# Patient Record
Sex: FEMALE | Race: BLACK OR AFRICAN AMERICAN | Employment: UNEMPLOYED | URBAN - METROPOLITAN AREA
[De-identification: names, ages, dates, MRNs, and addresses within clinical notes are randomized per-mention and may not be internally consistent; named-entity substitution may affect disease eponyms.]

---

## 2018-07-11 ENCOUNTER — APPOINTMENT (OUTPATIENT)
Dept: GENERAL RADIOLOGY | Age: 58
End: 2018-07-11
Attending: NURSE PRACTITIONER
Payer: MEDICARE

## 2018-07-11 ENCOUNTER — HOSPITAL ENCOUNTER (EMERGENCY)
Age: 58
Discharge: HOME | End: 2018-07-11
Attending: EMERGENCY MEDICINE
Payer: MEDICARE

## 2018-07-11 VITALS
HEART RATE: 67 BPM | DIASTOLIC BLOOD PRESSURE: 57 MMHG | OXYGEN SATURATION: 97 % | RESPIRATION RATE: 15 BRPM | SYSTOLIC BLOOD PRESSURE: 175 MMHG | TEMPERATURE: 98.2 F

## 2018-07-11 DIAGNOSIS — R07.89 CHEST WALL PAIN: ICD-10-CM

## 2018-07-11 DIAGNOSIS — M54.2 NECK PAIN ON LEFT SIDE: ICD-10-CM

## 2018-07-11 DIAGNOSIS — M54.12 CERVICAL RADICULAR PAIN: Primary | ICD-10-CM

## 2018-07-11 DIAGNOSIS — I10 ESSENTIAL HYPERTENSION: ICD-10-CM

## 2018-07-11 DIAGNOSIS — M79.602 LEFT ARM PAIN: ICD-10-CM

## 2018-07-11 DIAGNOSIS — M25.512 LEFT SHOULDER PAIN, UNSPECIFIED CHRONICITY: ICD-10-CM

## 2018-07-11 LAB
ANION GAP SERPL CALC-SCNC: 7 MMOL/L (ref 3–18)
BASOPHILS # BLD: 0 K/UL (ref 0–0.1)
BASOPHILS NFR BLD: 0 % (ref 0–2)
BNP SERPL-MCNC: 323 PG/ML (ref 0–900)
BUN SERPL-MCNC: 24 MG/DL (ref 7–18)
BUN/CREAT SERPL: 14 (ref 12–20)
CALCIUM SERPL-MCNC: 8.9 MG/DL (ref 8.5–10.1)
CHLORIDE SERPL-SCNC: 109 MMOL/L (ref 100–108)
CK MB CFR SERPL CALC: 1.7 % (ref 0–4)
CK MB SERPL-MCNC: 3.1 NG/ML (ref 5–25)
CK SERPL-CCNC: 178 U/L (ref 26–192)
CO2 SERPL-SCNC: 27 MMOL/L (ref 21–32)
CREAT SERPL-MCNC: 1.66 MG/DL (ref 0.6–1.3)
DIFFERENTIAL METHOD BLD: ABNORMAL
EOSINOPHIL # BLD: 0.3 K/UL (ref 0–0.4)
EOSINOPHIL NFR BLD: 4 % (ref 0–5)
ERYTHROCYTE [DISTWIDTH] IN BLOOD BY AUTOMATED COUNT: 15.8 % (ref 11.6–14.5)
GLUCOSE SERPL-MCNC: 174 MG/DL (ref 74–99)
HCT VFR BLD AUTO: 36.1 % (ref 35–45)
HGB BLD-MCNC: 11.5 G/DL (ref 12–16)
LYMPHOCYTES # BLD: 2.1 K/UL (ref 0.9–3.6)
LYMPHOCYTES NFR BLD: 28 % (ref 21–52)
MCH RBC QN AUTO: 24.6 PG (ref 24–34)
MCHC RBC AUTO-ENTMCNC: 31.9 G/DL (ref 31–37)
MCV RBC AUTO: 77.3 FL (ref 74–97)
MONOCYTES # BLD: 0.4 K/UL (ref 0.05–1.2)
MONOCYTES NFR BLD: 5 % (ref 3–10)
NEUTS SEG # BLD: 4.6 K/UL (ref 1.8–8)
NEUTS SEG NFR BLD: 63 % (ref 40–73)
PLATELET # BLD AUTO: 213 K/UL (ref 135–420)
PMV BLD AUTO: 11.1 FL (ref 9.2–11.8)
POTASSIUM SERPL-SCNC: 4.1 MMOL/L (ref 3.5–5.5)
RBC # BLD AUTO: 4.67 M/UL (ref 4.2–5.3)
SODIUM SERPL-SCNC: 143 MMOL/L (ref 136–145)
TROPONIN I SERPL-MCNC: <0.02 NG/ML (ref 0–0.04)
TROPONIN I SERPL-MCNC: <0.02 NG/ML (ref 0–0.04)
WBC # BLD AUTO: 7.4 K/UL (ref 4.6–13.2)

## 2018-07-11 PROCEDURE — 74011250637 HC RX REV CODE- 250/637: Performed by: EMERGENCY MEDICINE

## 2018-07-11 PROCEDURE — 80048 BASIC METABOLIC PNL TOTAL CA: CPT | Performed by: NURSE PRACTITIONER

## 2018-07-11 PROCEDURE — 99285 EMERGENCY DEPT VISIT HI MDM: CPT

## 2018-07-11 PROCEDURE — 77030029684 HC NEB SM VOL KT MONA -A

## 2018-07-11 PROCEDURE — 96375 TX/PRO/DX INJ NEW DRUG ADDON: CPT

## 2018-07-11 PROCEDURE — 83880 ASSAY OF NATRIURETIC PEPTIDE: CPT | Performed by: EMERGENCY MEDICINE

## 2018-07-11 PROCEDURE — 71045 X-RAY EXAM CHEST 1 VIEW: CPT

## 2018-07-11 PROCEDURE — 94640 AIRWAY INHALATION TREATMENT: CPT

## 2018-07-11 PROCEDURE — 93005 ELECTROCARDIOGRAM TRACING: CPT

## 2018-07-11 PROCEDURE — 82550 ASSAY OF CK (CPK): CPT | Performed by: NURSE PRACTITIONER

## 2018-07-11 PROCEDURE — 96361 HYDRATE IV INFUSION ADD-ON: CPT

## 2018-07-11 PROCEDURE — 74011250636 HC RX REV CODE- 250/636: Performed by: EMERGENCY MEDICINE

## 2018-07-11 PROCEDURE — 96374 THER/PROPH/DIAG INJ IV PUSH: CPT

## 2018-07-11 PROCEDURE — 85025 COMPLETE CBC W/AUTO DIFF WBC: CPT | Performed by: NURSE PRACTITIONER

## 2018-07-11 PROCEDURE — 74011000250 HC RX REV CODE- 250: Performed by: EMERGENCY MEDICINE

## 2018-07-11 RX ORDER — ACETAMINOPHEN 500 MG
1000 TABLET ORAL
Qty: 50 TAB | Refills: 0 | Status: SHIPPED | OUTPATIENT
Start: 2018-07-11

## 2018-07-11 RX ORDER — GUAIFENESIN 100 MG/5ML
324 LIQUID (ML) ORAL
Status: COMPLETED | OUTPATIENT
Start: 2018-07-11 | End: 2018-07-11

## 2018-07-11 RX ORDER — NAPROXEN 500 MG/1
500 TABLET ORAL 2 TIMES DAILY WITH MEALS
Qty: 20 TAB | Refills: 0 | Status: SHIPPED | OUTPATIENT
Start: 2018-07-11 | End: 2018-07-21

## 2018-07-11 RX ORDER — NAPROXEN 250 MG/1
500 TABLET ORAL 2 TIMES DAILY WITH MEALS
Status: DISCONTINUED | OUTPATIENT
Start: 2018-07-11 | End: 2018-07-11

## 2018-07-11 RX ORDER — TRAMADOL HYDROCHLORIDE 50 MG/1
50 TABLET ORAL
Qty: 8 TAB | Refills: 0 | Status: SHIPPED | OUTPATIENT
Start: 2018-07-11

## 2018-07-11 RX ORDER — HYDRALAZINE HYDROCHLORIDE 20 MG/ML
20 INJECTION INTRAMUSCULAR; INTRAVENOUS ONCE
Status: DISCONTINUED | OUTPATIENT
Start: 2018-07-11 | End: 2018-07-11

## 2018-07-11 RX ORDER — NAPROXEN 250 MG/1
500 TABLET ORAL
Status: COMPLETED | OUTPATIENT
Start: 2018-07-11 | End: 2018-07-11

## 2018-07-11 RX ORDER — IPRATROPIUM BROMIDE AND ALBUTEROL SULFATE 2.5; .5 MG/3ML; MG/3ML
3 SOLUTION RESPIRATORY (INHALATION) ONCE
Status: COMPLETED | OUTPATIENT
Start: 2018-07-11 | End: 2018-07-11

## 2018-07-11 RX ORDER — ACETAMINOPHEN 500 MG
1000 TABLET ORAL
Status: COMPLETED | OUTPATIENT
Start: 2018-07-11 | End: 2018-07-11

## 2018-07-11 RX ORDER — TRAMADOL HYDROCHLORIDE 50 MG/1
50 TABLET ORAL ONCE
Status: COMPLETED | OUTPATIENT
Start: 2018-07-11 | End: 2018-07-11

## 2018-07-11 RX ORDER — HYDRALAZINE HYDROCHLORIDE 20 MG/ML
20 INJECTION INTRAMUSCULAR; INTRAVENOUS ONCE
Status: COMPLETED | OUTPATIENT
Start: 2018-07-11 | End: 2018-07-11

## 2018-07-11 RX ORDER — LABETALOL HCL 20 MG/4 ML
20 SYRINGE (ML) INTRAVENOUS ONCE
Status: COMPLETED | OUTPATIENT
Start: 2018-07-11 | End: 2018-07-11

## 2018-07-11 RX ADMIN — ACETAMINOPHEN 1000 MG: 500 TABLET, FILM COATED ORAL at 14:48

## 2018-07-11 RX ADMIN — HYDRALAZINE HYDROCHLORIDE 20 MG: 20 INJECTION INTRAMUSCULAR; INTRAVENOUS at 18:02

## 2018-07-11 RX ADMIN — ACETAMINOPHEN 1000 MG: 500 TABLET, FILM COATED ORAL at 18:02

## 2018-07-11 RX ADMIN — TRAMADOL HYDROCHLORIDE 50 MG: 50 TABLET, FILM COATED ORAL at 14:48

## 2018-07-11 RX ADMIN — IPRATROPIUM BROMIDE AND ALBUTEROL SULFATE 3 ML: .5; 3 SOLUTION RESPIRATORY (INHALATION) at 13:59

## 2018-07-11 RX ADMIN — NAPROXEN 500 MG: 250 TABLET ORAL at 18:02

## 2018-07-11 RX ADMIN — ASPIRIN 81 MG 324 MG: 81 TABLET ORAL at 14:48

## 2018-07-11 RX ADMIN — SODIUM CHLORIDE 500 ML: 900 INJECTION, SOLUTION INTRAVENOUS at 13:59

## 2018-07-11 RX ADMIN — LABETALOL 20 MG/4 ML (5 MG/ML) INTRAVENOUS SYRINGE 20 MG: at 13:40

## 2018-07-11 NOTE — ED PROVIDER NOTES
EMERGENCY DEPARTMENT HISTORY AND PHYSICAL EXAM    1:25 PM      Date: 7/11/2018  Patient Name: Teagan Shannon    History of Presenting Illness     Chief Complaint   Patient presents with    Arm Pain    Hypertension         History Provided By: Patient    Chief Complaint: Shoulder Pain  Duration:3  Weeks  Timing:  Intermittent  Location: Left arm radiating down arm and to neck  Quality: Sharp  Severity: Moderate  Modifying Factors: No relief with aleve  Associated Symptoms: left chest pain that radiates to the back      Additional History (Context): 2:09 PM Teagan Shannon is a 60 y.o. female who presents to ED complaining of moderate sharp intermittent left shoulder pain radiating down the arm and to the neck associated with left sided chest pain that radiates to the back onset 3 weeks. Patient states that the sx have been intermittent but daily. States that she also had a stress test 2 years ago at [facility] by Dr. Kary Henriquez. She denies any modification to the pain with lifting and also denies any injury to the arm. She does not smoke or use ETOH. No other concerns or symptoms at this time. PCP: Wanda Rand MD          Past History     Past Medical History:   HTN    Family History:  No family history on file. Social History:  Denies Illicit drug and tobacco use    Allergies: Allergies   Allergen Reactions    Penicillins Other (comments)     \"I fall out\"       Review of Systems       Review of Systems   Constitutional: Negative for chills and fever. HENT: Negative for ear pain and sore throat. Eyes: Negative for pain and visual disturbance. Respiratory: Negative for cough and shortness of breath. Cardiovascular: Positive for chest pain (left). Negative for palpitations. Gastrointestinal: Negative for abdominal pain, diarrhea, nausea and vomiting. Genitourinary: Negative for flank pain. Musculoskeletal: Positive for back pain (left) and neck pain (left).         Left shoulder pain  Left arm pain   Neurological: Negative for syncope and headaches. Psychiatric/Behavioral: Negative for agitation. The patient is not nervous/anxious. Physical Exam     Visit Vitals    BP (!) 215/67    Pulse 78    Temp 98.2 °F (36.8 °C)    Resp 18    SpO2 97%         Physical Exam   Constitutional: She is oriented to person, place, and time. She appears well-developed and well-nourished. HENT:   Head: Normocephalic and atraumatic. Mouth/Throat: Oropharynx is clear and moist.   Eyes: Pupils are equal, round, and reactive to light. No scleral icterus. Neck: Neck supple. No tracheal deviation present. Cardiovascular: Regular rhythm. No murmur heard. Pulmonary/Chest: Effort normal. No respiratory distress. She has wheezes (faint expiratory on the right). She exhibits tenderness. Abdominal: Soft. There is no tenderness. Musculoskeletal: Normal range of motion. She exhibits tenderness. She exhibits no deformity. TTP to the musculature of left back around the scapula, trapezius, and left cervical paraspinous. Reproducible chest wall pain to left chest   Neurological: She is alert and oriented to person, place, and time. No gross neuro deficit   Skin: Skin is warm and dry. No rash noted. She is not diaphoretic. Psychiatric: She has a normal mood and affect. Nursing note and vitals reviewed.         Diagnostic Study Results     Labs -  Labs Reviewed   CBC WITH AUTOMATED DIFF - Abnormal; Notable for the following:        Result Value    HGB 11.5 (*)     RDW 15.8 (*)     All other components within normal limits   METABOLIC PANEL, BASIC - Abnormal; Notable for the following:     Chloride 109 (*)     Glucose 174 (*)     BUN 24 (*)     Creatinine 1.66 (*)     GFR est AA 38 (*)     GFR est non-AA 32 (*)     All other components within normal limits   CARDIAC PANEL,(CK, CKMB & TROPONIN)   NT-PRO BNP   TROPONIN I       Radiologic Studies -   XR CHEST PORT   Final Result IMPRESSION  IMPRESSION:        1. Mild cardiomegaly without CHF. No acute pulmonary disease. Medical Decision Making   I am the first provider for this patient. I reviewed the vital signs, available nursing notes, past medical history, past surgical history, family history and social history. Vital Signs-Reviewed the patient's vital signs. Pulse Oximetry Analysis -  97% on room air , Normal    Cardiac Monitor:  Rate: 78 bom  Rhythm:  Normal Sinus Rhythm     EKG: Interpreted by the EP. Time 15:39  NSR at a rate of 68 bpm. Normal axis. Intervals within normal limits. Nonspecific T wave abnormalities in the inferior and lateral leads. No acute ST elevations      Records Reviewed: Nursing Notes and Old Medical Records (Time of Review: 1:25 PM)      MDM, Progress Notes, Reevaluation, and Consults:    DDX: cervical radiculopathy, osteorthritis. Diabetic neuropathy, PAD, unlikely MI, hypertensive emergency, ACS    ED Course     5:14 PM   Repeat BP were checked in both arms which were both 230/70 and 228/70 with 2+ equal radial pulse and mediastinal widening on CXR. Patient denies any CP currently, no HA, but continues to have some pain in her left shoulder and arm. Patient reports having Physical therapy for similar sx in past that presented with her documented cervical radiculopathy in 2017. Record review shows BP documented in January of 182/90. Do not feel sxs are of cardiac etiology, troponin negative x 2 with no significant ecg changes. Bp improved with medications, has long hx of essential HTN.      The patient was given:  Medications   labetalol (NORMODYNE;TRANDATE) 20 mg/4 mL (5 mg/mL) injection 20 mg (20 mg IntraVENous Given 7/11/18 1340)   albuterol-ipratropium (DUO-NEB) 2.5 MG-0.5 MG/3 ML (3 mL Nebulization Given 7/11/18 1359)   sodium chloride 0.9 % bolus infusion 500 mL (500 mL IntraVENous New Bag 7/11/18 1359)   acetaminophen (TYLENOL) tablet 1,000 mg (1,000 mg Oral Given 7/11/18 1448) aspirin chewable tablet 324 mg (324 mg Oral Given 7/11/18 1448)   traMADol (ULTRAM) tablet 50 mg (50 mg Oral Given 7/11/18 1448)     Patient has no new complaints, changes, or physical findings. Diagnostic studies were reviewed with the patient. Pt and/or family's questions and concerns were addressed. Care plan was outlined, including follow-up with PCP/specialist and return precautions were discussed. Patient is felt to be stable for discharge at this time. Diagnosis     Clinical Impression:   1. Left arm pain    2. Left shoulder pain, unspecified chronicity    3. Neck pain on left side    4. Cervical radicular pain        Disposition: DC    Follow-up Information     None           Patient's Medications    No medications on file     _______________________________  180 Ronnie Lugo acting as a scribe for and in the presence of Anderson Marr DO      July 11, 2018 at [facility] PM       Provider Attestation:      I personally performed the services described in the documentation, reviewed the documentation, as recorded by the scribe in my presence, and it accurately and completely records my words and actions.  July 11, 2018 at 1:25 PM - Anderson Marr DO

## 2018-07-11 NOTE — ED TRIAGE NOTES
Pt arrived through triage with c/o left arm and shoulder pain x 3 weeks. Pt BP noted to be 246/104 in triage.

## 2018-07-12 LAB
ATRIAL RATE: 68 BPM
ATRIAL RATE: 81 BPM
CALCULATED P AXIS, ECG09: 28 DEGREES
CALCULATED P AXIS, ECG09: 37 DEGREES
CALCULATED R AXIS, ECG10: -4 DEGREES
CALCULATED R AXIS, ECG10: -5 DEGREES
CALCULATED T AXIS, ECG11: -3 DEGREES
CALCULATED T AXIS, ECG11: 43 DEGREES
DIAGNOSIS, 93000: NORMAL
DIAGNOSIS, 93000: NORMAL
P-R INTERVAL, ECG05: 164 MS
P-R INTERVAL, ECG05: 180 MS
Q-T INTERVAL, ECG07: 404 MS
Q-T INTERVAL, ECG07: 412 MS
QRS DURATION, ECG06: 78 MS
QRS DURATION, ECG06: 84 MS
QTC CALCULATION (BEZET), ECG08: 438 MS
QTC CALCULATION (BEZET), ECG08: 469 MS
VENTRICULAR RATE, ECG03: 68 BPM
VENTRICULAR RATE, ECG03: 81 BPM

## 2020-10-04 ENCOUNTER — APPOINTMENT (OUTPATIENT)
Dept: CT IMAGING | Age: 60
End: 2020-10-04
Attending: STUDENT IN AN ORGANIZED HEALTH CARE EDUCATION/TRAINING PROGRAM
Payer: MEDICARE

## 2020-10-04 ENCOUNTER — APPOINTMENT (OUTPATIENT)
Dept: GENERAL RADIOLOGY | Age: 60
End: 2020-10-04
Attending: STUDENT IN AN ORGANIZED HEALTH CARE EDUCATION/TRAINING PROGRAM
Payer: MEDICARE

## 2020-10-04 ENCOUNTER — HOSPITAL ENCOUNTER (OUTPATIENT)
Age: 60
Setting detail: OBSERVATION
Discharge: HOME | End: 2020-10-07
Attending: STUDENT IN AN ORGANIZED HEALTH CARE EDUCATION/TRAINING PROGRAM | Admitting: INTERNAL MEDICINE
Payer: MEDICARE

## 2020-10-04 ENCOUNTER — APPOINTMENT (OUTPATIENT)
Dept: ULTRASOUND IMAGING | Age: 60
End: 2020-10-04
Attending: STUDENT IN AN ORGANIZED HEALTH CARE EDUCATION/TRAINING PROGRAM
Payer: MEDICARE

## 2020-10-04 DIAGNOSIS — N18.9 ACUTE ON CHRONIC RENAL INSUFFICIENCY: Primary | ICD-10-CM

## 2020-10-04 DIAGNOSIS — N28.9 ACUTE ON CHRONIC RENAL INSUFFICIENCY: Primary | ICD-10-CM

## 2020-10-04 DIAGNOSIS — R10.11 ABDOMINAL PAIN, RIGHT UPPER QUADRANT: ICD-10-CM

## 2020-10-04 PROBLEM — E87.5 HYPERKALEMIA: Status: ACTIVE | Noted: 2020-10-04

## 2020-10-04 PROBLEM — N17.9 AKI (ACUTE KIDNEY INJURY) (HCC): Status: ACTIVE | Noted: 2020-10-04

## 2020-10-04 PROBLEM — I10 HTN (HYPERTENSION): Status: ACTIVE | Noted: 2020-10-04

## 2020-10-04 PROBLEM — E66.9 OBESITY: Status: ACTIVE | Noted: 2020-10-04

## 2020-10-04 PROBLEM — M54.50: Status: ACTIVE | Noted: 2020-10-04

## 2020-10-04 LAB
ALBUMIN SERPL-MCNC: 2.9 G/DL (ref 3.4–5)
ALBUMIN/GLOB SERPL: 0.7 {RATIO} (ref 0.8–1.7)
ALP SERPL-CCNC: 238 U/L (ref 45–117)
ALT SERPL-CCNC: 21 U/L (ref 13–56)
ANION GAP SERPL CALC-SCNC: 8 MMOL/L (ref 3–18)
APPEARANCE UR: CLEAR
AST SERPL-CCNC: 16 U/L (ref 10–38)
BACTERIA URNS QL MICRO: ABNORMAL /HPF
BASOPHILS # BLD: 0 K/UL (ref 0–0.1)
BASOPHILS NFR BLD: 0 % (ref 0–2)
BILIRUB DIRECT SERPL-MCNC: <0.1 MG/DL (ref 0–0.2)
BILIRUB SERPL-MCNC: 0.2 MG/DL (ref 0.2–1)
BILIRUB UR QL: NEGATIVE
BUN SERPL-MCNC: 45 MG/DL (ref 7–18)
BUN/CREAT SERPL: 16 (ref 12–20)
CALCIUM SERPL-MCNC: 8.7 MG/DL (ref 8.5–10.1)
CHLORIDE SERPL-SCNC: 115 MMOL/L (ref 100–111)
CO2 SERPL-SCNC: 18 MMOL/L (ref 21–32)
COLOR UR: YELLOW
CREAT SERPL-MCNC: 2.86 MG/DL (ref 0.6–1.3)
DIFFERENTIAL METHOD BLD: ABNORMAL
EOSINOPHIL # BLD: 0.2 K/UL (ref 0–0.4)
EOSINOPHIL NFR BLD: 3 % (ref 0–5)
EPITH CASTS URNS QL MICRO: ABNORMAL /LPF (ref 0–5)
ERYTHROCYTE [DISTWIDTH] IN BLOOD BY AUTOMATED COUNT: 16 % (ref 11.6–14.5)
GLOBULIN SER CALC-MCNC: 4.4 G/DL (ref 2–4)
GLUCOSE BLD STRIP.AUTO-MCNC: 111 MG/DL (ref 70–110)
GLUCOSE SERPL-MCNC: 103 MG/DL (ref 74–99)
GLUCOSE UR STRIP.AUTO-MCNC: NEGATIVE MG/DL
HCT VFR BLD AUTO: 32.8 % (ref 35–45)
HGB BLD-MCNC: 9.9 G/DL (ref 12–16)
HGB UR QL STRIP: NEGATIVE
KETONES UR QL STRIP.AUTO: NEGATIVE MG/DL
LACTATE BLD-SCNC: 0.79 MMOL/L (ref 0.4–2)
LEUKOCYTE ESTERASE UR QL STRIP.AUTO: NEGATIVE
LIPASE SERPL-CCNC: 448 U/L (ref 73–393)
LYMPHOCYTES # BLD: 1.4 K/UL (ref 0.9–3.6)
LYMPHOCYTES NFR BLD: 18 % (ref 21–52)
MCH RBC QN AUTO: 23.6 PG (ref 24–34)
MCHC RBC AUTO-ENTMCNC: 30.2 G/DL (ref 31–37)
MCV RBC AUTO: 78.3 FL (ref 74–97)
MONOCYTES # BLD: 0.5 K/UL (ref 0.05–1.2)
MONOCYTES NFR BLD: 6 % (ref 3–10)
NEUTS SEG # BLD: 5.5 K/UL (ref 1.8–8)
NEUTS SEG NFR BLD: 73 % (ref 40–73)
NITRITE UR QL STRIP.AUTO: NEGATIVE
PH UR STRIP: 6.5 [PH] (ref 5–8)
PLATELET # BLD AUTO: 224 K/UL (ref 135–420)
PMV BLD AUTO: 11.5 FL (ref 9.2–11.8)
POTASSIUM SERPL-SCNC: 5.6 MMOL/L (ref 3.5–5.5)
PROT SERPL-MCNC: 7.3 G/DL (ref 6.4–8.2)
PROT UR STRIP-MCNC: 300 MG/DL
RBC # BLD AUTO: 4.19 M/UL (ref 4.2–5.3)
RBC #/AREA URNS HPF: ABNORMAL /HPF (ref 0–5)
SODIUM SERPL-SCNC: 141 MMOL/L (ref 136–145)
SP GR UR REFRACTOMETRY: 1.01 (ref 1–1.03)
UROBILINOGEN UR QL STRIP.AUTO: 0.2 EU/DL (ref 0.2–1)
WBC # BLD AUTO: 7.7 K/UL (ref 4.6–13.2)
WBC URNS QL MICRO: ABNORMAL /HPF (ref 0–4)

## 2020-10-04 PROCEDURE — 81001 URINALYSIS AUTO W/SCOPE: CPT

## 2020-10-04 PROCEDURE — 80048 BASIC METABOLIC PNL TOTAL CA: CPT

## 2020-10-04 PROCEDURE — 83690 ASSAY OF LIPASE: CPT

## 2020-10-04 PROCEDURE — 83605 ASSAY OF LACTIC ACID: CPT

## 2020-10-04 PROCEDURE — 65390000012 HC CONDITION CODE 44 OBSERVATION

## 2020-10-04 PROCEDURE — 85025 COMPLETE CBC W/AUTO DIFF WBC: CPT

## 2020-10-04 PROCEDURE — 82962 GLUCOSE BLOOD TEST: CPT

## 2020-10-04 PROCEDURE — 2709999900 HC NON-CHARGEABLE SUPPLY

## 2020-10-04 PROCEDURE — 74011250636 HC RX REV CODE- 250/636: Performed by: INTERNAL MEDICINE

## 2020-10-04 PROCEDURE — 71046 X-RAY EXAM CHEST 2 VIEWS: CPT

## 2020-10-04 PROCEDURE — 99284 EMERGENCY DEPT VISIT MOD MDM: CPT

## 2020-10-04 PROCEDURE — 74011250637 HC RX REV CODE- 250/637: Performed by: STUDENT IN AN ORGANIZED HEALTH CARE EDUCATION/TRAINING PROGRAM

## 2020-10-04 PROCEDURE — 74176 CT ABD & PELVIS W/O CONTRAST: CPT

## 2020-10-04 PROCEDURE — 99218 HC RM OBSERVATION: CPT

## 2020-10-04 PROCEDURE — 74011250637 HC RX REV CODE- 250/637: Performed by: INTERNAL MEDICINE

## 2020-10-04 PROCEDURE — 96372 THER/PROPH/DIAG INJ SC/IM: CPT

## 2020-10-04 PROCEDURE — 76705 ECHO EXAM OF ABDOMEN: CPT

## 2020-10-04 PROCEDURE — 80076 HEPATIC FUNCTION PANEL: CPT

## 2020-10-04 RX ORDER — CARVEDILOL 12.5 MG/1
25 TABLET ORAL
Status: COMPLETED | OUTPATIENT
Start: 2020-10-04 | End: 2020-10-04

## 2020-10-04 RX ORDER — PHENYTOIN 50 MG/1
100 TABLET, CHEWABLE ORAL 3 TIMES DAILY
COMMUNITY

## 2020-10-04 RX ORDER — LOSARTAN POTASSIUM 50 MG/1
100 TABLET ORAL DAILY
Status: DISCONTINUED | OUTPATIENT
Start: 2020-10-04 | End: 2020-10-04

## 2020-10-04 RX ORDER — OMEPRAZOLE 20 MG/1
40 CAPSULE, DELAYED RELEASE ORAL DAILY
COMMUNITY

## 2020-10-04 RX ORDER — CHLORTHALIDONE 25 MG/1
25 TABLET ORAL DAILY
COMMUNITY
End: 2020-10-07

## 2020-10-04 RX ORDER — TRAMADOL HYDROCHLORIDE 50 MG/1
50 TABLET ORAL
Status: DISCONTINUED | OUTPATIENT
Start: 2020-10-04 | End: 2020-10-07 | Stop reason: HOSPADM

## 2020-10-04 RX ORDER — HYDRALAZINE HYDROCHLORIDE 50 MG/1
25 TABLET, FILM COATED ORAL 3 TIMES DAILY
Status: ON HOLD | COMMUNITY
End: 2020-10-07 | Stop reason: SDUPTHER

## 2020-10-04 RX ORDER — DEXTROSE MONOHYDRATE 25 G/50ML
25-50 INJECTION, SOLUTION INTRAVENOUS AS NEEDED
Status: DISCONTINUED | OUTPATIENT
Start: 2020-10-04 | End: 2020-10-07 | Stop reason: HOSPADM

## 2020-10-04 RX ORDER — GUAIFENESIN 100 MG/5ML
81 LIQUID (ML) ORAL DAILY
COMMUNITY

## 2020-10-04 RX ORDER — SODIUM CHLORIDE, SODIUM LACTATE, POTASSIUM CHLORIDE, CALCIUM CHLORIDE 600; 310; 30; 20 MG/100ML; MG/100ML; MG/100ML; MG/100ML
150 INJECTION, SOLUTION INTRAVENOUS CONTINUOUS
Status: DISCONTINUED | OUTPATIENT
Start: 2020-10-04 | End: 2020-10-07 | Stop reason: HOSPADM

## 2020-10-04 RX ORDER — SODIUM POLYSTYRENE SULFONATE 15 G/60ML
15 SUSPENSION ORAL; RECTAL
Status: COMPLETED | OUTPATIENT
Start: 2020-10-04 | End: 2020-10-04

## 2020-10-04 RX ORDER — HEPARIN SODIUM 5000 [USP'U]/ML
5000 INJECTION, SOLUTION INTRAVENOUS; SUBCUTANEOUS EVERY 8 HOURS
Status: DISCONTINUED | OUTPATIENT
Start: 2020-10-04 | End: 2020-10-07 | Stop reason: HOSPADM

## 2020-10-04 RX ORDER — HYDRALAZINE HYDROCHLORIDE 20 MG/ML
10 INJECTION INTRAMUSCULAR; INTRAVENOUS
Status: DISCONTINUED | OUTPATIENT
Start: 2020-10-04 | End: 2020-10-07 | Stop reason: HOSPADM

## 2020-10-04 RX ORDER — MAGNESIUM SULFATE 100 %
4 CRYSTALS MISCELLANEOUS AS NEEDED
Status: DISCONTINUED | OUTPATIENT
Start: 2020-10-04 | End: 2020-10-07 | Stop reason: HOSPADM

## 2020-10-04 RX ORDER — SODIUM CHLORIDE 0.9 % (FLUSH) 0.9 %
5-40 SYRINGE (ML) INJECTION AS NEEDED
Status: DISCONTINUED | OUTPATIENT
Start: 2020-10-04 | End: 2020-10-07 | Stop reason: HOSPADM

## 2020-10-04 RX ORDER — SODIUM CHLORIDE 0.9 % (FLUSH) 0.9 %
5-40 SYRINGE (ML) INJECTION EVERY 8 HOURS
Status: DISCONTINUED | OUTPATIENT
Start: 2020-10-04 | End: 2020-10-07 | Stop reason: HOSPADM

## 2020-10-04 RX ORDER — ACETAMINOPHEN 500 MG
1000 TABLET ORAL
Status: DISCONTINUED | OUTPATIENT
Start: 2020-10-04 | End: 2020-10-07 | Stop reason: HOSPADM

## 2020-10-04 RX ORDER — LOSARTAN POTASSIUM 50 MG/1
100 TABLET ORAL DAILY
Status: DISCONTINUED | OUTPATIENT
Start: 2020-10-05 | End: 2020-10-04

## 2020-10-04 RX ORDER — ATORVASTATIN CALCIUM 40 MG/1
40 TABLET, FILM COATED ORAL DAILY
COMMUNITY

## 2020-10-04 RX ORDER — HYDROCODONE BITARTRATE AND ACETAMINOPHEN 5; 325 MG/1; MG/1
1 TABLET ORAL
Status: DISCONTINUED | OUTPATIENT
Start: 2020-10-04 | End: 2020-10-04

## 2020-10-04 RX ORDER — PHENOL/SODIUM PHENOLATE
20 AEROSOL, SPRAY (ML) MUCOUS MEMBRANE DAILY
COMMUNITY

## 2020-10-04 RX ORDER — INSULIN LISPRO 100 [IU]/ML
INJECTION, SOLUTION INTRAVENOUS; SUBCUTANEOUS
Status: DISCONTINUED | OUTPATIENT
Start: 2020-10-05 | End: 2020-10-07 | Stop reason: HOSPADM

## 2020-10-04 RX ORDER — METFORMIN HYDROCHLORIDE 500 MG/1
500 TABLET ORAL DAILY
COMMUNITY
End: 2020-10-07

## 2020-10-04 RX ORDER — NALOXONE HYDROCHLORIDE 0.4 MG/ML
0.4 INJECTION, SOLUTION INTRAMUSCULAR; INTRAVENOUS; SUBCUTANEOUS AS NEEDED
Status: DISCONTINUED | OUTPATIENT
Start: 2020-10-04 | End: 2020-10-07 | Stop reason: HOSPADM

## 2020-10-04 RX ADMIN — HEPARIN SODIUM 5000 UNITS: 5000 INJECTION INTRAVENOUS; SUBCUTANEOUS at 21:20

## 2020-10-04 RX ADMIN — ACETAMINOPHEN 1000 MG: 500 TABLET, FILM COATED ORAL at 21:20

## 2020-10-04 RX ADMIN — LOSARTAN POTASSIUM 100 MG: 50 TABLET, FILM COATED ORAL at 14:12

## 2020-10-04 RX ADMIN — SODIUM POLYSTYRENE SULFONATE 15 G: 15 SUSPENSION ORAL; RECTAL at 13:25

## 2020-10-04 RX ADMIN — TRAMADOL HYDROCHLORIDE 50 MG: 50 TABLET, FILM COATED ORAL at 23:33

## 2020-10-04 RX ADMIN — Medication 10 ML: at 21:35

## 2020-10-04 RX ADMIN — NIFEDIPINE 90 MG: 60 TABLET, FILM COATED, EXTENDED RELEASE ORAL at 14:12

## 2020-10-04 RX ADMIN — HYDROCODONE BITARTRATE AND ACETAMINOPHEN 1 TABLET: 5; 325 TABLET ORAL at 16:20

## 2020-10-04 RX ADMIN — HYDROCODONE BITARTRATE AND ACETAMINOPHEN 1 TABLET: 5; 325 TABLET ORAL at 12:12

## 2020-10-04 RX ADMIN — CARVEDILOL 25 MG: 12.5 TABLET, FILM COATED ORAL at 14:12

## 2020-10-04 RX ADMIN — SODIUM CHLORIDE, SODIUM LACTATE, POTASSIUM CHLORIDE, AND CALCIUM CHLORIDE 150 ML/HR: 600; 310; 30; 20 INJECTION, SOLUTION INTRAVENOUS at 19:58

## 2020-10-04 NOTE — ED PROVIDER NOTES
HPI   Talisha Hurt is a 63 y.o. female with history of CKD who complains of right-sided flank pain, right chest wall pain for 4 days. She fell approximately 11 days ago on her right side, believes she had just a sore shoulder however 4 days ago she had worsening right upper quadrant pain, is tender to palpation along the right upper quadrant, right lateral chest wall. She at first thought it was because of the fall. She states she also has a history of asthma. She is not wheezing at this time. She last saw her PCP 2 months ago. History reviewed. No pertinent past medical history. History reviewed. No pertinent surgical history. History reviewed. No pertinent family history.     Social History     Socioeconomic History    Marital status: SINGLE     Spouse name: Not on file    Number of children: Not on file    Years of education: Not on file    Highest education level: Not on file   Occupational History    Not on file   Social Needs    Financial resource strain: Not on file    Food insecurity     Worry: Not on file     Inability: Not on file    Transportation needs     Medical: Not on file     Non-medical: Not on file   Tobacco Use    Smoking status: Not on file   Substance and Sexual Activity    Alcohol use: Not on file    Drug use: Not on file    Sexual activity: Not on file   Lifestyle    Physical activity     Days per week: Not on file     Minutes per session: Not on file    Stress: Not on file   Relationships    Social connections     Talks on phone: Not on file     Gets together: Not on file     Attends [employer] service: Not on file     Active member of club or organization: Not on file     Attends meetings of clubs or organizations: Not on file     Relationship status: Not on file    Intimate partner violence     Fear of current or ex partner: Not on file     Emotionally abused: Not on file     Physically abused: Not on file     Forced sexual activity: Not on file   Other Topics Concern    Not on file   Social History Narrative    Not on file         ALLERGIES: Penicillins    Review of Systems     In addition to that documented in the HPI above  Constitutional: Denies fevers or chills  Eyes: Denies vision changes  ENMT: Denies sore throat  CV: Denies chest pain  Resp: Denies SOB  GI: Denies vomiting or diarrhea  : Denies painful urination  MSK: Denies recent trauma  Skin: Denies new rashes  Neuro: Denies new numbness or tingling or weakness  Endocrine: Denies polyuria  Heme: Denies bleeding disorders      Vitals:    10/04/20 1400 10/04/20 1445 10/04/20 1500 10/04/20 1545   BP: (!) 206/82 (!) 177/70 (!) 186/56 (!) 177/64   Pulse:       Resp:       Temp:       SpO2: 100% 99% 98% 100%            Physical Exam   Vital signs wdl  General: Patient is well nourished, well developed, awake and alert, resting comfortably in no acute distress  Head: Normocephalic and atraumatic  Eyes: Extraocular muscles intact, no conjunctival pallor  Ear, nose, throat: Normal external exam  Neck: Normal range of motion  Cardiovascular: RRR, murmur auscultated, warm, well-perfused extremities  Respiratory: Patient is in no respiratory distress, lungs CTAB  GI: soft, non-distended  TTP ruq, right lateral lower ribs  MSK: No gross deformities appreciated  Extremities: pulses intact with good cap refills, no LE pitting edema or calf tenderness  Skin: Warm, dry, and intact  Neuro: The patient is alert and oriented, no gross motor or sensory defects noted. Psych: Appropriate mood and affect. MDM   Considered Pancreatitis, Cholecystitis, Cholangitis, Hepatitis, Appendicitis, Diverticulitis, Colitis, Gastritis, PUD, Ureterolithiasis, SBO, Gastroparesis (DM?), Bowel Perforation (?recent scope or hx of PUD?)      Concerning for elevated alk phos, lipase.      ED Course as of Oct 04 1640   Sun Oct 04, 2020   1201 URINE MICROSCOPIC ONLY(!):    WBC 0 to 3   RBC 0 to 3   Epithelial cells 2+   Bacteria 1+(!) [DM]   1313 Given kayexalate    [DM]      ED Course User Index  [DM] Ravi Denis MD   2:01 PM  Giving home dose Procardia 90, losartan 100, carvedilol 25. She has not taken any medications for hypertension today. Procedures      From [facility] records    albuterol (PROVENTIL, VENTOLIN) 90 mcg/Actuation INH HFAA   Take 2 Puffs inhaled by mouth Every 4 Hours As Needed.   0       aspirin 81 mg PO CHEW   Take 1 Tab by Mouth Once a Day. 30 Tab   5 03/27/2015     atorvastatin (LIPITOR) 40 mg PO TABS   Take 1 Tab by Mouth Every Night at Bedtime. 30 Tab   5 03/27/2015     carvedilol 25 mg PO TABS   Take 1 Tab by Mouth 2 Times Daily with Meals. 60 Tab   1 11/17/2018     chlorthalidone (HYGROTON) 25 mg PO TABS   Take 50 mg by Mouth Once a Day.   0       HYDROcodone-acetaminophen (NORCO 5) 5-325 mg PO TABS   Take 1 Tab by Mouth Every 6 Hours As Needed. 40 Tab   0 11/26/2018     Losartan 100 mg PO TABS   Take 100 mg by Mouth Once a Day.   0       metFORMIN ER (GLUCOPHAGE ER) 500 mg PO TB24 24 hour tablet   Take 1,000 mg by Mouth Daily in A. M.   0       NIFEdipine 24HR (PROCARDIA XL) 90 mg PO TR24   Take 90 mg by Mouth Once a Day.   0       OMEPRAZOLE PO   Take 20 mg by Mouth Once a Day.   0       sitaGLIPtin (JANUVIA) 100 mg PO TABS   Take 100 mg by Mouth Once a Day.   0       Spironolactone 50 mg PO TABS   Take 50 mg by Mouth Once a Day.   0       tamsulosin (FLOMAX) 0.4 mg PO CP24   Take 1 Cap by Mouth Daily After supper. 30 Cap   0 11/17/2018     VITAMIN D2 50,000 unit PO CAPS   take 1 capsule by mouth every 7 days 4 Cap   11 10/02/2017     phenyTOIN ER (DILANTIN) 100 mg PO CAPS                   SIGN OUT:  2:20 PM  Patient's presentation, labs/imaging and plan of care was reviewed with Dr. Odilia Zamora as part of sign out. They will follow up on CT/US as part of the plan discussed with the patient.     Dr. Daphne Blevins assistance in completion of this plan is greatly appreciated but it should be noted that I will be the provider of record for this patient.

## 2020-10-04 NOTE — PROGRESS NOTES
1805  Received pt from Er via stretcher, alert and oriented, pain under control at this time,no skin breakdown noted. No SOB but she said she has asthma, SOB noted upon coming from the bathroom. No nausea, slight pain  On her ride side of the abdomen below right rib cage, lungs clear at this time. Blood sugar done, dietary notified for her dinner, pt up  Walking in the room.

## 2020-10-04 NOTE — ED NOTES
TRANSFER - ED to INPATIENT REPORT:    Verbal report given to Nupur Renteria (name) on Maria C Reyes  being transferred to [address] [place] [address] (unit) for routine progression of care       Report consisted of patients Situation, Background, Assessment and   Recommendations(SBAR). SBAR report made available to receiving floor on this patient being transferred to [address] [place] [address] (2200)  for routine progression of care       Admitting diagnosis JANNETTE (acute kidney injury) ([facility] [address] [N17.9]    Information from the following report(s) SBAR was made available to receiving floor. Lines:   Peripheral IV 10/04/20 Left Hand (Active)   Site Assessment Clean, dry, & intact 10/04/20 1117   Phlebitis Assessment 0 10/04/20 1117   Infiltration Assessment 0 10/04/20 1117   Dressing Status Clean, dry, & intact 10/04/20 1117   Dressing Type Transparent 10/04/20 1117        HCG Status for ALL Females under 55 y/o: NO     Medication list confirmed with patient    Opportunity for questions and clarification was provided.       Patient is oriented to time, place, person and situation   Patient is  continent and ambulatory without assist     Valuables transported with patient     Patient transported with:   Tech    MAP (Monitor): 87 =Monitored (most recent)  Vitals w/ MEWS Score (last day)     Date/Time MEWS Score Pulse Resp Temp BP Level of Consciousness SpO2    10/04/20 1700          (!) 162/59    100 %    10/04/20 1545          (!) 177/64    100 %    10/04/20 1500          (!) 186/56    98 %    10/04/20 1445          (!) 177/70    99 %    10/04/20 1400          (!) 206/82    100 %    10/04/20 1354              100 %    10/04/20 1353          (!) 219/88        10/04/20 1325              100 %    10/04/20 1324          (!) 230/99        10/04/20 1034    76              10/04/20 1031              99 %    10/04/20 1030              99 %    10/04/20 1029              100 %    10/04/20 1028         (!) 184/72        10/04/20 1013      18  97.4 °F (36.3 °C)  (!) 187/113  Alert  100 %              Septic Patients:     Lactic Acid  Lab Results   Component Value Date    LACPOC 0.79 10/04/2020    (Most recent on top)  Repeat drawn: NO Time: NA     ALL LACTIC ACIDS GREATER THAN 2 MUST BE REPEATED POC WITHIN 4 HOURS OR PRIOR TO ADMISSION               Total Fluid Bolus initiated and documented on MAR: NO    All ordered antibiotics initiated within first 3 hours of TIME ZERO?   NO

## 2020-10-04 NOTE — ED NOTES
Note:  Assuming care of patient at beginning of shift    2:41 PM  I, Delfin Villar MD, assumed care of patient at the beginning of my shift. 2:41 PM    Date: 10/4/2020  Patient Name: Kaya Ma    History of Presenting Illness     Chief Complaint   Patient presents with    Flank Pain       Nursing notes regarding the HPI and triage nursing notes were reviewed. Prior medical records were reviewed. Current Facility-Administered Medications   Medication Dose Route Frequency Provider Last Rate Last Dose    HYDROcodone-acetaminophen (NORCO) 5-325 mg per tablet 1 Tab  1 Tab Oral Q4H PRN Lana De La O MD   1 Tab at 10/04/20 1212    losartan (COZAAR) tablet 100 mg  100 mg Oral DAILY Mario De La O MD   100 mg at 10/04/20 1412    NIFEdipine ER (PROCARDIA XL) tablet 90 mg  90 mg Oral DAILY Mario De La O MD   90 mg at 10/04/20 1412     Current Outpatient Medications   Medication Sig Dispense Refill    acetaminophen (TYLENOL EXTRA STRENGTH) 500 mg tablet Take 2 Tabs by mouth every six (6) hours as needed for Pain. 50 Tab 0    traMADol (ULTRAM) 50 mg tablet Take 1 Tab by mouth every six (6) hours as needed for Pain. Max Daily Amount: 200 mg. 8 Tab 0       Past History     Past Medical History:  History reviewed. No pertinent past medical history. Past Surgical History:  History reviewed. No pertinent surgical history. Family History:  History reviewed. No pertinent family history. Social History:  Social History     Tobacco Use    Smoking status: Not on file   Substance Use Topics    Alcohol use: Not on file    Drug use: Not on file       Allergies:   Allergies   Allergen Reactions    Penicillins Other (comments)     \"I fall out\"       Patient's primary care provider (as noted in EPIC):  Miguel Whitman MD    Abnormal lab results from this emergency department encounter:  Labs Reviewed   URINALYSIS W/ RFLX MICROSCOPIC - Abnormal; Notable for the following components: Result Value    Protein 300 (*)     All other components within normal limits   CBC WITH AUTOMATED DIFF - Abnormal; Notable for the following components:    RBC 4.19 (*)     HGB 9.9 (*)     HCT 32.8 (*)     MCH 23.6 (*)     MCHC 30.2 (*)     RDW 16.0 (*)     LYMPHOCYTES 18 (*)     All other components within normal limits   LIPASE - Abnormal; Notable for the following components:    Lipase 448 (*)     All other components within normal limits   HEPATIC FUNCTION PANEL - Abnormal; Notable for the following components:    Albumin 2.9 (*)     Globulin 4.4 (*)     A-G Ratio 0.7 (*)     Alk.  phosphatase 238 (*)     All other components within normal limits   METABOLIC PANEL, BASIC - Abnormal; Notable for the following components:    Potassium 5.6 (*)     Chloride 115 (*)     CO2 18 (*)     Glucose 103 (*)     BUN 45 (*)     Creatinine 2.86 (*)     GFR est AA 20 (*)     GFR est non-AA 17 (*)     All other components within normal limits   URINE MICROSCOPIC ONLY - Abnormal; Notable for the following components:    Bacteria 1+ (*)     All other components within normal limits   POC LACTIC ACID       Lab values for this patient within approximately the last 12 hours:  Recent Results (from the past 12 hour(s))   URINALYSIS W/ RFLX MICROSCOPIC    Collection Time: 10/04/20 10:21 AM   Result Value Ref Range    Color YELLOW      Appearance CLEAR      Specific gravity 1.013 1.005 - 1.030      pH (UA) 6.5 5.0 - 8.0      Protein 300 (A) NEG mg/dL    Glucose Negative NEG mg/dL    Ketone Negative NEG mg/dL    Bilirubin Negative NEG      Blood Negative NEG      Urobilinogen 0.2 0.2 - 1.0 EU/dL    Nitrites Negative NEG      Leukocyte Esterase Negative NEG     URINE MICROSCOPIC ONLY    Collection Time: 10/04/20 10:21 AM   Result Value Ref Range    WBC 0 to 3 0 - 4 /hpf    RBC 0 to 3 0 - 5 /hpf    Epithelial cells 2+ 0 - 5 /lpf    Bacteria 1+ (A) NEG /hpf   CBC WITH AUTOMATED DIFF    Collection Time: 10/04/20 11:12 AM   Result Value Ref Range    WBC 7.7 4.6 - 13.2 K/uL    RBC 4.19 (L) 4.20 - 5.30 M/uL    HGB 9.9 (L) 12.0 - 16.0 g/dL    HCT 32.8 (L) 35.0 - 45.0 %    MCV 78.3 74.0 - 97.0 FL    MCH 23.6 (L) 24.0 - 34.0 PG    MCHC 30.2 (L) 31.0 - 37.0 g/dL    RDW 16.0 (H) 11.6 - 14.5 %    PLATELET 855 361 - 475 K/uL    MPV 11.5 9.2 - 11.8 FL    NEUTROPHILS 73 40 - 73 %    LYMPHOCYTES 18 (L) 21 - 52 %    MONOCYTES 6 3 - 10 %    EOSINOPHILS 3 0 - 5 %    BASOPHILS 0 0 - 2 %    ABS. NEUTROPHILS 5.5 1.8 - 8.0 K/UL    ABS. LYMPHOCYTES 1.4 0.9 - 3.6 K/UL    ABS. MONOCYTES 0.5 0.05 - 1.2 K/UL    ABS. EOSINOPHILS 0.2 0.0 - 0.4 K/UL    ABS. BASOPHILS 0.0 0.0 - 0.1 K/UL    DF AUTOMATED     LIPASE    Collection Time: 10/04/20 11:12 AM   Result Value Ref Range    Lipase 448 (H) 73 - 393 U/L   HEPATIC FUNCTION PANEL    Collection Time: 10/04/20 11:12 AM   Result Value Ref Range    Protein, total 7.3 6.4 - 8.2 g/dL    Albumin 2.9 (L) 3.4 - 5.0 g/dL    Globulin 4.4 (H) 2.0 - 4.0 g/dL    A-G Ratio 0.7 (L) 0.8 - 1.7      Bilirubin, total 0.2 0.2 - 1.0 MG/DL    Bilirubin, direct <0.1 0.0 - 0.2 MG/DL    Alk.  phosphatase 238 (H) 45 - 117 U/L    AST (SGOT) 16 10 - 38 U/L    ALT (SGPT) 21 13 - 56 U/L   METABOLIC PANEL, BASIC    Collection Time: 10/04/20 11:12 AM   Result Value Ref Range    Sodium 141 136 - 145 mmol/L    Potassium 5.6 (H) 3.5 - 5.5 mmol/L    Chloride 115 (H) 100 - 111 mmol/L    CO2 18 (L) 21 - 32 mmol/L    Anion gap 8 3.0 - 18 mmol/L    Glucose 103 (H) 74 - 99 mg/dL    BUN 45 (H) 7.0 - 18 MG/DL    Creatinine 2.86 (H) 0.6 - 1.3 MG/DL    BUN/Creatinine ratio 16 12 - 20      GFR est AA 20 (L) >60 ml/min/1.73m2    GFR est non-AA 17 (L) >60 ml/min/1.73m2    Calcium 8.7 8.5 - 10.1 MG/DL   POC LACTIC ACID    Collection Time: 10/04/20 11:14 AM   Result Value Ref Range    Lactic Acid (POC) 0.79 0.40 - 2.00 mmol/L       Radiologist and cardiologist interpretations if available at time of this note:  Radiology results:  Xr Chest Pa Lat    Result Date: 10/4/2020  EXAM: CHEST, TWO VIEWS CLINICAL INDICATION/HISTORY: right side pain   > Additional: Rib pain x4 days. Denies injury. COMPARISON: 7/11/2018 TECHNIQUE: PA and lateral views of the chest were obtained. _______________ FINDINGS: HEART AND MEDIASTINUM: Cardiac silhouette is slightly prominent without change. Aorta is tortuous with mild calcified plaque at the arch. LUNGS AND PLEURAL SPACES: Pulmonary vessels are normal. Lungs are clear. No pneumothorax or pleural effusion. BONY THORAX AND SOFT TISSUES: No acute osseous abnormality. _______________     IMPRESSION: Mild prominence of the cardiac silhouette, no acute changes. Ct Abd Pelv Wo Cont    Result Date: 10/4/2020  EXAM: CT of the abdomen and pelvis INDICATION: Flank pain COMPARISON: None TECHNIQUE: Helical volumetric scanning through the abdomen and pelvis was performed without intravenous contrast.  Axial, coronal and sagittal reconstructions were created. One or more dose reduction techniques were used on this CT: automated exposure control, adjustment of the mAs and/or kVp according to patient size, and iterative reconstruction techniques. The specific techniques used on this CT exam have been documented in the patient's electronic medical record. Digital Imaging and Communications in Medicine (DICOM) format image data are available to nonaffiliated external healthcare facilities or entities on a secure, media free, reciprocally searchable basis with patient authorization for at least a 12-month period after this study. _______________ FINDINGS: LOWER CHEST: Within normal limits. LIVER, BILIARY: 2.4 cm smooth rounded water attenuation lesion in the subcapsular posterior right hepatic lobe is very likely a benign cyst. No suspicious liver lesions. No biliary dilation. Gallbladder is unremarkable. PANCREAS: Normal. SPLEEN: Normal. ADRENALS: Slight fullness of the bilateral adrenal glands, likely benign, probably hyperplasia.  KIDNEYS: No renal or ureteral calculi. No signs of obstructive uropathy. Renal parenchyma appears normal for noncontrast technique. LYMPH NODES: No enlarged lymph nodes. GASTROINTESTINAL TRACT: Sigmoid colon diverticulosis is present without signs of diverticulitis. No bowel dilatation or wall thickening. Normal appendix. PELVIC ORGANS: Hysterectomy. Urinary bladder is nearly completely empty with apparent wall thickening probably due to its decompressed state. VASCULATURE: Scattered calcific atherosclerosis is present. No AAA. BONES: No acute or aggressive osseous abnormalities identified. OTHER: Small fat-containing umbilical hernia. _______________     IMPRESSION: 1. No renal or ureteral calculi. No signs of obstructive uropathy. 2. Nearly completely empty urinary bladder with bladder wall thickening probably due to its decompressed state. Cystitis cannot be completely excluded. 3. Colonic diverticulosis without signs of diverticulitis. 4. 2.4 cm water density lesion in the right hepatic lobe, likely a benign cyst. A preliminary report was provided by the radiology resident on call at the time of the study. [address] [place] [address]    Result Date: 10/4/2020  EXAM: Limited right upper quadrant abdominal ultrasound INDICATION: Right upper quadrant pain. COMPARISON: Right upper quadrant abdominal pain TECHNIQUE: Real-time sonography in multiple planes of the [right upper quadrant was performed with image documentation. Grayscale, color flow Doppler imaging, and velocity spectral waveform analysis of the portal vein was performed (duplex imaging). ] _______________ FINDINGS: PANCREAS: The pancreatic head and body are normal, the pancreatic tail was incompletely visualized due to shadowing from overlying bowel gas. LIVER: Normal in echotexture. No focal mass. Color flow Doppler and velocity spectral waveform analysis of the portal vein shows normal (hepatopedal) direction of flow. Portal vein caliber measures 8 mm.  BILIARY SYSTEM: No intrahepatic biliary dilatation. Common bile duct is normal in caliber measuring 4 mm. GALLBLADDER: No gallbladder filling defects, wall thickening or pericholecystic fluid. The technologist reports absence of a sonographic Tang sign. RIGHT KIDNEY: 9.9 cm in length. No hydronephrosis or renal mass. No renal calculi. Right renal echotexture is similar to the adjacent liver. IVC: Visualized portions are unremarkable in appearance. OTHER: No ascites. _______________     IMPRESSION: Negative sonographic assessment of the gallbladder and biliary tree. Mildly increased right renal echotexture, possibly medical renal disease. A preliminary report was provided by the radiology resident on call at the time of the study. Medication(s) ordered for patient during this emergency visit encounter:  Medications   HYDROcodone-acetaminophen (NORCO) 5-325 mg per tablet 1 Tab (1 Tab Oral Given 10/4/20 1212)   losartan (COZAAR) tablet 100 mg (100 mg Oral Given 10/4/20 1412)   NIFEdipine ER (PROCARDIA XL) tablet 90 mg (90 mg Oral Given 10/4/20 1412)   sodium polystyrene (KAYEXALATE) 15 gram/60 mL oral suspension 15 g (15 g Oral Given 10/4/20 1325)   carvediloL (COREG) tablet 25 mg (25 mg Oral Given 10/4/20 1412)       Pt care assumed from Dr. Rizwana Shepherd , ED provider. Pt complaint(s), current treatment plan, progression and available diagnostic results have been discussed thoroughly. Rounding occurred: no  Intended Disposition: TBD. Pending diagnostic reports and/or labs (please list): CT abdomen pelvis and right upper quadrant ultrasound the patient with right upper quadrant abdominal pain. Noted the patient has acute on chronic renal sufficiency will probably need admission for that. Admit to Hospitalist    The patient was presented to the accepting hospitalist, Dr. Pola Glover.     The patient's primary doctor is Tamia Jacobs MD, and admissions for this physician are with the hospitalist.  If the patient has no primary doctor, then admission is to the hospitalist as well. As the emergency physician, I wrote courtesy admission orders for the hospitalist physician. The courtesy orders included explicit instructions for the floor nursing staff to call the admitting attending physician upon patient arrival on the floor. Dictation disclaimer:  Please note that this dictation was completed with [employer], the computer voice recognition software. Quite often unanticipated grammatical, syntax, homophones, and other interpretive errors are inadvertently transcribed by the computer software. Please disregard these errors. Please excuse any errors that have escaped final proofreading. Coding Diagnoses     Clinical Impression:   1. Acute on chronic renal insufficiency    2. Abdominal pain, right upper quadrant        Disposition     Disposition:  Admit. Octavio Hawks L. Kerrin Babinski, M.D.   JUSTYN Board Certified Emergency Physician

## 2020-10-04 NOTE — H&P
Internal Medicine History and Physical  Note           NAME:  Aryan Herman   :   1960   MRN:  [id number]     PCP:  Rene Khan MD     Date/Time:  10/4/2020 2:59 PM      I hereby certify this patient for admission based upon medical necessity as noted below:        Assessment / plan :        #  JANNETTE (acute kidney injury) ([facility] [address] (10/4/2020). Likely due to NSAID. . IVF. Monitor Renal function and other labs as indicated. Avoid nephrotoxins , iv Contrast, NSAID. Renally dosing medications. Monitor urine out put. Avoid fleets enemas due to concern for acute phosphate nephropathy. #   Obesity (10/4/2020). There is no height or weight on file to calculate BMI. #   HTN (hypertension) (10/4/2020) Hold ARB for now. Control BP      #  Right loin pain (10/4/2020). Likely MS. Had a fall recently. Control pains , no NSAID    #   Hyperkalemia (10/4/2020). Mild-   follow . Hold ARB and no  K supplements        # Continue home regimen / Medications when appropriate. -DVT prophylaxis :  heparin.   - Code Status : FULL    -Other chronic medical problems as per past history. Further management depend on the course of the case and expanded data base. DISPO - pt to be admitted at this time for reasons addressed above, continued hospitalization for ongoing assessment and treatment indicated        Subjective:     CHIEF COMPLAINT: R Loin pains     HISTORY OF PRESENT ILLNESS:     Ms. Mary Mcallister is a 63 y.o.  female who is admitted for JANNETTE. Ms. Mary Mcallister with past medical history as noted below , presented to the Emergency Department today  complaining of above. Triage and ER notes noted. ER MD wanted to admit the patient to the hospital for further management and called hospitalist to facilitate this process. Patient had a fall days ago ,4 days ago started to have R loin/ lower ribs pains.  Deny smoking , deny alcohol or substances. Came for check up and S.cr increased significantly . Report good UOP, normal and no changes. Use about 4 Aleve tabs a day for her pains. Because of her R loin pains ER MD sent her for UA/CT abdomen and came with no acute lesion . Deny fever. Report she was drinking good amount of water . History reviewed. No pertinent past medical history. History reviewed. No pertinent surgical history. Social History     Tobacco Use    Smoking status: Not on file   Substance Use Topics    Alcohol use: Not on file        History reviewed. No pertinent family history. Allergies   Allergen Reactions    Penicillins Other (comments)     \"I fall out\"        Prior to Admission medications    Medication Sig Start Date End Date Taking? Authorizing Provider   acetaminophen (TYLENOL EXTRA STRENGTH) 500 mg tablet Take 2 Tabs by mouth every six (6) hours as needed for Pain. 7/11/18   Greg Richards MD   traMADol Twilla Males) 50 mg tablet Take 1 Tab by mouth every six (6) hours as needed for Pain.  Max Daily Amount: 200 mg. 7/11/18   Greg Richards MD       Review of Systems:  (bold if positive, otherwise negative), apart from what mentioned in HPI  Apart from above patient deny any other significant complain  Gen:  Weight gain, weight loss, fever, chills, fatigue  Eyes:  Visual changes, pain, conjunctivitis  ENT:  Sore throat, rhinorrhea, decreased hearing  CVS:  Palpitations, chest pain, dizziness, syncope, edema, PND  Pulm:  Cough, dyspnea, sputum, hemoptysis, wheezing  GI:  Abdominal pain, nausea, emesis, diarrhea, constipation, GERD, melena  :  Hematuria, incontinence, nocturia, dysuria, discharge  MS:  Pain, weakness, swelling, arthritis  Skin:  Rash, erythema, abscess, wound, moles  Endo:  Heat intolerance, cold intolerance, weight gain, polyuria  Hem:  Enlarged nodes, bruising, bleeding, night sweats  Renal:  Edema, change in urine, flank pain  Neuro:  Numbness, tingling, weakness, seizure, headache, tremors       VITALS:    Vital signs reviewed; most recent are:    Visit Vitals  BP (!) 186/56   Pulse 76   Temp 97.4 °F (36.3 °C)   Resp 18   SpO2 98%     SpO2 Readings from Last 6 Encounters:   10/04/20 98%   07/11/18 97%        No intake or output data in the 24 hours ending 10/04/20 6305     Physical Exam:     Gen:  Appear stated age, Well-developed, in no acute distress  HEENT: significant loss of teeth,   Head atraumatic, normocephalic , hearing intact to voice, DRY  mucous membranes. Neck:  Trachea midline , No apparent JVD, Supple, without masses, thyroid non-tender  Resp:  No accessory muscle use,Bilateral BS present, clear breath sounds without wheezes rales or rhonchi  Card:    normal S1, S2 without Gallop . No Significant lower leg peripheral edema. Abd:  Soft, non-tender, non-distended, bowel sounds are present    Musc:  No cyanosis or clubbing. Skin: Warm and dry . No rashes or ulcers, skin turgor is good. Neuro:  Cranial nerves are grossly intact, no clear area of focal motor weakness, follows commands appropriately. Psych:    oriented to person, place and time, alert. Labs: All Cardiac Markers in the last 24 hours: No results found for: CPK, CK, CKMMB, CKMB, RCK3, CKMBT, CKNDX, CKND1, ERIK, TROPT, TROIQ, IRA, TROPT, TNIPOC, BNP, BNPP    Recent Labs     10/04/20  1112   WBC 7.7   HGB 9.9*   HCT 32.8*        Recent Labs     10/04/20  1112      K 5.6*   *   CO2 18*   *   BUN 45*   CREA 2.86*   CA 8.7   ALB 2.9*   TBILI 0.2   ALT 21     No results found for: GLUCPOC  No results for input(s): PH, PCO2, PO2, HCO3, FIO2 in the last 72 hours. No results for input(s): INR, INREXT, INREXT in the last 72 hours. Xr Chest Pa Lat    Result Date: 10/4/2020  IMPRESSION: Mild prominence of the cardiac silhouette, no acute changes. Ct Abd Pelv Wo Cont    Result Date: 10/4/2020  IMPRESSION: 1. No renal or ureteral calculi. No signs of obstructive uropathy.  2. Nearly completely empty urinary bladder with bladder wall thickening probably due to its decompressed state. Cystitis cannot be completely excluded. 3. Colonic diverticulosis without signs of diverticulitis. 4. 2.4 cm water density lesion in the right hepatic lobe, likely a benign cyst. A preliminary report was provided by the radiology resident on call at the time of the study. [address] [facility]    Result Date: 10/4/2020  IMPRESSION: Negative sonographic assessment of the gallbladder and biliary tree. Mildly increased right renal echotexture, possibly medical renal disease. A preliminary report was provided by the radiology resident on call at the time of the study. Please refer to radiology reports. Risk of deterioration: high      Total time spent in the care of this patient: [address] discussed with: Patient, Nursing Staff, >50% of time spent in counseling and coordination of care and ER MD      Discussed:  Care Plan       I have personally reviewed all pertinent labs, films and EKGs that have officially resulted. I reviewed available pertaining electronic documentation outlining the initial presentation as well as the emergency room physician's encounter. This document in whole or part of it has been produced using voice recognition software. Unrecognized errors in transcription may be present.     Attending Physician: Mateusz Zamorano MD

## 2020-10-04 NOTE — ED NOTES
Attempting to call report. Jennifer Vidal states patient that nurse is busy and will give me a call.

## 2020-10-05 ENCOUNTER — APPOINTMENT (OUTPATIENT)
Dept: GENERAL RADIOLOGY | Age: 60
End: 2020-10-05
Attending: INTERNAL MEDICINE
Payer: MEDICARE

## 2020-10-05 LAB
ANION GAP SERPL CALC-SCNC: 6 MMOL/L (ref 3–18)
ANION GAP SERPL CALC-SCNC: 7 MMOL/L (ref 3–18)
BASOPHILS # BLD: 0 K/UL (ref 0–0.1)
BASOPHILS NFR BLD: 0 % (ref 0–2)
BUN SERPL-MCNC: 43 MG/DL (ref 7–18)
BUN SERPL-MCNC: 43 MG/DL (ref 7–18)
BUN/CREAT SERPL: 15 (ref 12–20)
BUN/CREAT SERPL: 15 (ref 12–20)
CALCIUM SERPL-MCNC: 8 MG/DL (ref 8.5–10.1)
CALCIUM SERPL-MCNC: 8.2 MG/DL (ref 8.5–10.1)
CHLORIDE SERPL-SCNC: 113 MMOL/L (ref 100–111)
CHLORIDE SERPL-SCNC: 114 MMOL/L (ref 100–111)
CO2 SERPL-SCNC: 20 MMOL/L (ref 21–32)
CO2 SERPL-SCNC: 21 MMOL/L (ref 21–32)
CREAT SERPL-MCNC: 2.87 MG/DL (ref 0.6–1.3)
CREAT SERPL-MCNC: 2.88 MG/DL (ref 0.6–1.3)
DIFFERENTIAL METHOD BLD: ABNORMAL
EOSINOPHIL # BLD: 0.2 K/UL (ref 0–0.4)
EOSINOPHIL NFR BLD: 3 % (ref 0–5)
ERYTHROCYTE [DISTWIDTH] IN BLOOD BY AUTOMATED COUNT: 16.3 % (ref 11.6–14.5)
EST. AVERAGE GLUCOSE BLD GHB EST-MCNC: 143 MG/DL
GLUCOSE BLD STRIP.AUTO-MCNC: 102 MG/DL (ref 70–110)
GLUCOSE BLD STRIP.AUTO-MCNC: 128 MG/DL (ref 70–110)
GLUCOSE BLD STRIP.AUTO-MCNC: 94 MG/DL (ref 70–110)
GLUCOSE SERPL-MCNC: 103 MG/DL (ref 74–99)
GLUCOSE SERPL-MCNC: 115 MG/DL (ref 74–99)
HBA1C MFR BLD: 6.6 % (ref 4.2–5.6)
HCT VFR BLD AUTO: 30.8 % (ref 35–45)
HGB BLD-MCNC: 9.2 G/DL (ref 12–16)
LYMPHOCYTES # BLD: 1.7 K/UL (ref 0.9–3.6)
LYMPHOCYTES NFR BLD: 23 % (ref 21–52)
MAGNESIUM SERPL-MCNC: 1.8 MG/DL (ref 1.6–2.6)
MCH RBC QN AUTO: 23.5 PG (ref 24–34)
MCHC RBC AUTO-ENTMCNC: 29.9 G/DL (ref 31–37)
MCV RBC AUTO: 78.6 FL (ref 74–97)
MONOCYTES # BLD: 0.6 K/UL (ref 0.05–1.2)
MONOCYTES NFR BLD: 7 % (ref 3–10)
NEUTS SEG # BLD: 5.1 K/UL (ref 1.8–8)
NEUTS SEG NFR BLD: 67 % (ref 40–73)
PHOSPHATE SERPL-MCNC: 4.7 MG/DL (ref 2.5–4.9)
PLATELET # BLD AUTO: 270 K/UL (ref 135–420)
PMV BLD AUTO: 11.1 FL (ref 9.2–11.8)
POTASSIUM SERPL-SCNC: 5 MMOL/L (ref 3.5–5.5)
POTASSIUM SERPL-SCNC: 5.1 MMOL/L (ref 3.5–5.5)
RBC # BLD AUTO: 3.92 M/UL (ref 4.2–5.3)
SODIUM SERPL-SCNC: 140 MMOL/L (ref 136–145)
SODIUM SERPL-SCNC: 141 MMOL/L (ref 136–145)
WBC # BLD AUTO: 7.7 K/UL (ref 4.6–13.2)

## 2020-10-05 PROCEDURE — 99218 HC RM OBSERVATION: CPT

## 2020-10-05 PROCEDURE — 96374 THER/PROPH/DIAG INJ IV PUSH: CPT

## 2020-10-05 PROCEDURE — 74011250636 HC RX REV CODE- 250/636: Performed by: INTERNAL MEDICINE

## 2020-10-05 PROCEDURE — 74011250637 HC RX REV CODE- 250/637: Performed by: INTERNAL MEDICINE

## 2020-10-05 PROCEDURE — 85025 COMPLETE CBC W/AUTO DIFF WBC: CPT

## 2020-10-05 PROCEDURE — 80048 BASIC METABOLIC PNL TOTAL CA: CPT

## 2020-10-05 PROCEDURE — 71100 X-RAY EXAM RIBS UNI 2 VIEWS: CPT

## 2020-10-05 PROCEDURE — 82962 GLUCOSE BLOOD TEST: CPT

## 2020-10-05 PROCEDURE — 96372 THER/PROPH/DIAG INJ SC/IM: CPT

## 2020-10-05 PROCEDURE — 83735 ASSAY OF MAGNESIUM: CPT

## 2020-10-05 PROCEDURE — 74011000250 HC RX REV CODE- 250: Performed by: INTERNAL MEDICINE

## 2020-10-05 PROCEDURE — 83036 HEMOGLOBIN GLYCOSYLATED A1C: CPT

## 2020-10-05 PROCEDURE — 84100 ASSAY OF PHOSPHORUS: CPT

## 2020-10-05 PROCEDURE — 2709999900 HC NON-CHARGEABLE SUPPLY

## 2020-10-05 RX ORDER — PHENYTOIN 50 MG/1
50 TABLET, CHEWABLE ORAL 3 TIMES DAILY
Status: DISCONTINUED | OUTPATIENT
Start: 2020-10-05 | End: 2020-10-06

## 2020-10-05 RX ORDER — ATORVASTATIN CALCIUM 20 MG/1
40 TABLET, FILM COATED ORAL
Status: DISCONTINUED | OUTPATIENT
Start: 2020-10-05 | End: 2020-10-07 | Stop reason: HOSPADM

## 2020-10-05 RX ORDER — GUAIFENESIN 100 MG/5ML
81 LIQUID (ML) ORAL DAILY
Status: DISCONTINUED | OUTPATIENT
Start: 2020-10-05 | End: 2020-10-07 | Stop reason: HOSPADM

## 2020-10-05 RX ORDER — PANTOPRAZOLE SODIUM 40 MG/10ML
40 INJECTION, POWDER, LYOPHILIZED, FOR SOLUTION INTRAVENOUS DAILY PRN
Status: DISCONTINUED | OUTPATIENT
Start: 2020-10-05 | End: 2020-10-07 | Stop reason: HOSPADM

## 2020-10-05 RX ORDER — HYDRALAZINE HYDROCHLORIDE 50 MG/1
25 TABLET, FILM COATED ORAL 3 TIMES DAILY
Status: DISCONTINUED | OUTPATIENT
Start: 2020-10-05 | End: 2020-10-07 | Stop reason: HOSPADM

## 2020-10-05 RX ORDER — LIDOCAINE 4 G/100G
2 PATCH TOPICAL EVERY 24 HOURS
Status: DISCONTINUED | OUTPATIENT
Start: 2020-10-05 | End: 2020-10-07 | Stop reason: HOSPADM

## 2020-10-05 RX ORDER — MORPHINE SULFATE 2 MG/ML
2-4 INJECTION, SOLUTION INTRAMUSCULAR; INTRAVENOUS
Status: DISCONTINUED | OUTPATIENT
Start: 2020-10-05 | End: 2020-10-07 | Stop reason: HOSPADM

## 2020-10-05 RX ADMIN — SODIUM CHLORIDE, SODIUM LACTATE, POTASSIUM CHLORIDE, AND CALCIUM CHLORIDE 150 ML/HR: 600; 310; 30; 20 INJECTION, SOLUTION INTRAVENOUS at 09:14

## 2020-10-05 RX ADMIN — HEPARIN SODIUM 5000 UNITS: 5000 INJECTION INTRAVENOUS; SUBCUTANEOUS at 11:00

## 2020-10-05 RX ADMIN — HEPARIN SODIUM 5000 UNITS: 5000 INJECTION INTRAVENOUS; SUBCUTANEOUS at 02:00

## 2020-10-05 RX ADMIN — ASPIRIN 81 MG CHEWABLE TABLET 81 MG: 81 TABLET CHEWABLE at 09:08

## 2020-10-05 RX ADMIN — TRAMADOL HYDROCHLORIDE 50 MG: 50 TABLET, FILM COATED ORAL at 16:30

## 2020-10-05 RX ADMIN — PHENYTOIN 50 MG: 50 TABLET, CHEWABLE ORAL at 22:24

## 2020-10-05 RX ADMIN — ATORVASTATIN CALCIUM 40 MG: 20 TABLET, FILM COATED ORAL at 21:53

## 2020-10-05 RX ADMIN — NIFEDIPINE 90 MG: 60 TABLET, FILM COATED, EXTENDED RELEASE ORAL at 09:08

## 2020-10-05 RX ADMIN — SODIUM CHLORIDE, SODIUM LACTATE, POTASSIUM CHLORIDE, AND CALCIUM CHLORIDE 150 ML/HR: 600; 310; 30; 20 INJECTION, SOLUTION INTRAVENOUS at 04:00

## 2020-10-05 RX ADMIN — HYDRALAZINE HYDROCHLORIDE 25 MG: 50 TABLET, FILM COATED ORAL at 09:07

## 2020-10-05 RX ADMIN — HYDRALAZINE HYDROCHLORIDE 25 MG: 50 TABLET, FILM COATED ORAL at 22:28

## 2020-10-05 RX ADMIN — PHENYTOIN 50 MG: 50 TABLET, CHEWABLE ORAL at 09:08

## 2020-10-05 RX ADMIN — TRAMADOL HYDROCHLORIDE 50 MG: 50 TABLET, FILM COATED ORAL at 09:08

## 2020-10-05 RX ADMIN — MORPHINE SULFATE 2 MG: 2 INJECTION, SOLUTION INTRAMUSCULAR; INTRAVENOUS at 21:53

## 2020-10-05 RX ADMIN — Medication 10 ML: at 16:37

## 2020-10-05 RX ADMIN — HYDRALAZINE HYDROCHLORIDE 25 MG: 50 TABLET, FILM COATED ORAL at 16:30

## 2020-10-05 RX ADMIN — HEPARIN SODIUM 5000 UNITS: 5000 INJECTION INTRAVENOUS; SUBCUTANEOUS at 19:00

## 2020-10-05 RX ADMIN — ATORVASTATIN CALCIUM 40 MG: 20 TABLET, FILM COATED ORAL at 02:00

## 2020-10-05 RX ADMIN — Medication 10 ML: at 22:00

## 2020-10-05 RX ADMIN — SODIUM CHLORIDE, SODIUM LACTATE, POTASSIUM CHLORIDE, AND CALCIUM CHLORIDE 150 ML/HR: 600; 310; 30; 20 INJECTION, SOLUTION INTRAVENOUS at 17:09

## 2020-10-05 RX ADMIN — PHENYTOIN 50 MG: 50 TABLET, CHEWABLE ORAL at 16:30

## 2020-10-05 RX ADMIN — Medication 10 ML: at 06:00

## 2020-10-05 NOTE — PROGRESS NOTES
Problem: Falls - Risk of  Goal: *Absence of Falls  Description: Document Lemon Ricco Fall Risk and appropriate interventions in the flowsheet.   Outcome: Progressing Towards Goal  Note: Fall Risk Interventions:            Medication Interventions: Bed/chair exit alarm, Assess postural VS orthostatic hypotension         History of Falls Interventions: Bed/chair exit alarm

## 2020-10-05 NOTE — PROGRESS NOTES
0730: Patient received resting in bed, A+Ox4, able to communicate and make needs known. No s/s of distress noted. Bed locked and in lowest position. Call bell/phone in reach, verbalized understanding on how to use for assistance.     ~0900: Stated 9/10 pain to right rib region. Medicated as per MAR. MD aware of increased pain to right rib cage. Lidocaine patches & xray ordered. 1100: AM cares complete    ~1600: Medicated for rib pain as per MAR     1800: Pt currently resting in bed. Educated pt on JANNETTE and importance of deep breathing and proper pain management to enhance ability to deep breath, verbalized understanding.

## 2020-10-05 NOTE — PROGRESS NOTES
Problem: Discharge Planning  Goal: *Discharge to safe environment  Outcome: Progressing Towards Goal  Plan is home   Reason for Admission:   JANNETTE  RUR Score:     17%             PCP: First and Last name:  Amanda Espinoza   Name of Practice:    Are you a current patient: Yes/No: yes   Approximate date of last visit: spring Can you participate in a virtual visit if needed:     Do you (patient/family) have any concerns for transition/discharge? Plan for utilizing home health:       Current Advanced Directive/Advance Care Plan:  Says she has one but not seen  on chart. Transition of Care Plan:  Chart reviewed and pt verifed demographics. She lives with her  and he will drive her at dc. She is independent with ADL and   Uses no DME. Plan is home.  is Kerri Navarro [phone]    Care Management Interventions  PCP Verified by CM: Yes  Palliative Care Criteria Met (RRAT>21 & CHF Dx)?: No  Mode of Transport at Discharge: Other (see comment)( to drive)  Transition of Care Consult (CM Consult): Discharge Planning  Current Support Network: Lives with Spouse  Confirm Follow Up Transport: Family  The Plan for Transition of Care is Related to the Following Treatment Goals : resolution of acute symptoms  Discharge Location  Discharge Placement: Home     FERNANDEZ, Observation letter and Code 44 discussed and given to pt. Christi Hernandez.  PILI Quintero  [facility]  [phone], Pager [phone]  [email or web address]

## 2020-10-05 NOTE — PROGRESS NOTES
Problem: Falls - Risk of  Goal: *Absence of Falls  Description: Document Matt Schaffer Fall Risk and appropriate interventions in the flowsheet.   Outcome: Progressing Towards Goal  Note: Fall Risk Interventions:            Medication Interventions: Patient to call before getting OOB         History of Falls Interventions: Door open when patient unattended         Problem: Patient Education: Go to Patient Education Activity  Goal: Patient/Family Education  Outcome: Progressing Towards Goal

## 2020-10-05 NOTE — PROGRESS NOTES
1930  Bedside, Verbal, and Written shift change report given to [facility] (oncoming nurse) by Valerio Talbert (offgoing nurse). Report included the following information SBAR, Kardex, and MAR. Performed skin assessment with outgoing nurse. 2000  Patient is sitting on the side of the bed talking on the phone while eating dinner. Alert and oriented x 4. Room air, IV CDI, dressing CDI, no sign of distress. Bed low, call bell within reach. 2100  Patient handed me a list of home medications that is not in her chart. Patient likewise stated that she takes seizure medication daily. Patient's bed padded per seizure precaution. 2200  Patient is in bed, resting.     2300  Patient is in bed, sleeping. No sign of distress. Bed low, call bell within reach. 0000  Patient is in bed, alert and oriented. Sitting at the side of the bed.    0100  Patient is in bed, resting.    0200  Patient in the bathroom. No sign of distress. Bed low, call bell within reach. 0300  Patient is in bed, sleeping. No sign of distress. Bed low, call bell within reach. 0400  Patient is in bed, alert and oriented. Room air, IV CDI, dressing CDI, no sign of distress. Bed low, call bell within reach. 0730  Bedside, Verbal, and Written shift change report given to [address] (oncoming nurse) by [facility] (offgoing nurse). Report included the following information SBAR, Kardex, and MAR.

## 2020-10-05 NOTE — PROGRESS NOTES
Internal Medicine Progress Note        NAME: Jacy Mitchell   :  1960  MRM:  [id number]    Date/Time: 10/5/2020        ASSESSMENT/PLAN:    Principal Problem:    [facility] (10/4/2020)    Active Problems:    Obesity (10/4/2020)      HTN (hypertension) (10/4/2020)      Right loin pain (10/4/2020)      Hyperkalemia (10/4/2020)      #  JANNETTE (acute kidney injury) ([facility] (10/4/2020). Likely due to NSAID.   IVF. Monitor Renal function and other labs as indicated. Avoid nephrotoxins , iv Contrast, NSAID. Renally dosing medications. Monitor urine out put. Avoid fleets enemas due to concern for acute phosphate nephropathy.      #   Obesity (10/4/2020). There is no height or weight on file to calculate BMI.        #   HTN (hypertension) (10/4/2020) Hold ARB for now. Control BP       #  Right Lower ribs tenderness, XR  . Had a fall recently. Control pains , no NSAID     #   Hyperkalemia (10/4/2020). Mild- improved. follow . Hold ARB and no  K supplements           # Continue home regimen / Medications when appropriate.      -DVT prophylaxis :  heparin.   - Code Status : FULL       Lab Review:     Recent Labs     10/05/20  0220 10/04/20  1112   WBC 7.7 7.7   HGB 9.2* 9.9*   HCT 30.8* 32.8*    224     Recent Labs     10/05/20  0220 10/05/20  0055 10/04/20  1112    141 141   K 5.1 5.0 5.6*   * 114* 115*   CO2 20* 21 18*   * 115* 103*   BUN 43* 43* 45*   CREA 2.87* 2.88* 2.86*   CA 8.2* 8.0* 8.7   MG 1.8  --   --    PHOS 4.7  --   --    ALB  --   --  2.9*   TBILI  --   --  0.2   ALT  --   --  21     Lab Results   Component Value Date/Time    Glucose (POC) 94 10/05/2020 11:44 AM    Glucose (POC) 128 (H) 10/05/2020 08:16 AM    Glucose (POC) 111 (H) 10/04/2020 06:26 PM     No results for input(s): PH, PCO2, PO2, HCO3, FIO2 in the last 72 hours. No results for input(s): INR, INREXT in the last 72 hours.     No results found for: SDES  No results found for: CULT    All Cardiac Markers in the last 24 hours: No results found for: CPK, CK, CKMMB, CKMB, RCK3, CKMBT, CKNDX, CKND1, ERIK, TROPT, TROIQ, IRA, TROPT, TNIPOC, BNP, BNPP        Intervals noted   Pt s/e @ bedside     Subjective:     Chief Complaint:      R LOWER RIBS PAIN    ROS:  (bold if positive,otherwise negative)    Fever/chills ,  Dysuria   Cough , Sputum , SOB/BONNER , Chest Pain     Diarrhea ,Nausea/Vomit , Abd Pain , Constipation     Tolerating Diet                Objective:     Vitals:  Last 24hrs VS reviewed since prior progress note. Most recent are:    Visit Vitals  BP (!) 154/66 (BP 1 Location: Left arm, BP Patient Position: At rest)   Pulse 83   Temp 98.1 °F (36.7 °C)   Resp 18   SpO2 100%   Breastfeeding No     SpO2 Readings from Last 6 Encounters:   10/05/20 100%   07/11/18 97%            Intake/Output Summary (Last 24 hours) at 10/5/2020 1346  Last data filed at 10/5/2020 1338  Gross per 24 hour   Intake 1350 ml   Output 850 ml   Net 500 ml          Physical Exam:   Gen:  Appear stated age, Well-developed, in no acute distress  HEENT: significant loss of teeth,   Head atraumatic, normocephalic , hearing intact to voice, moist  mucous membranes. Neck:   Trachea midline , No apparent JVD, Supple, without masses, thyroid non-tender  Resp:  No accessory muscle use,Bilateral BS present, clear breath sounds without wheezes rales or rhonchi  Card:    normal S1, S2 without Gallop . No Significant lower leg peripheral edema. Abd:  Soft, non-tender, non-distended, bowel sounds are present    Musc:  No cyanosis or clubbing. Skin:   Warm and dry . No rashes or ulcers, skin turgor is good. Neuro:  Cranial nerves are grossly intact, no clear area of focal motor weakness, follows commands appropriately. Psych:    oriented to person, place and time, alert.     Medications Reviewed: (see below)    Lab Data Reviewed: (see below)    ______________________________________________________________________    Medications:     Current Facility-Administered Medications   Medication Dose Route Frequency    atorvastatin (LIPITOR) tablet 40 mg  40 mg Oral QHS    aspirin chewable tablet 81 mg  81 mg Oral DAILY    hydrALAZINE (APRESOLINE) tablet 25 mg  25 mg Oral TID    phenytoin (DILANTIN) chewable tablet 50 mg  50 mg Oral TID    pantoprazole (PROTONIX) injection 40 mg  40 mg IntraVENous DAILY PRN    lidocaine 4 % patch 2 Patch  2 Patch TransDERmal Q24H    NIFEdipine ER (PROCARDIA XL) tablet 90 mg  90 mg Oral DAILY    hydrALAZINE (APRESOLINE) 20 mg/mL injection 10 mg  10 mg IntraVENous Q6H PRN    acetaminophen (TYLENOL) tablet 1,000 mg  1,000 mg Oral Q6H PRN    traMADoL (ULTRAM) tablet 50 mg  50 mg Oral Q6H PRN    sodium chloride (NS) flush 5-40 mL  5-40 mL IntraVENous Q8H    sodium chloride (NS) flush 5-40 mL  5-40 mL IntraVENous PRN    lactated Ringers infusion  150 mL/hr IntraVENous CONTINUOUS    heparin (porcine) injection 5,000 Units  5,000 Units SubCUTAneous Q8H    insulin lispro (HUMALOG) injection   SubCUTAneous TIDAC    glucose chewable tablet 16 g  4 Tab Oral PRN    glucagon (GLUCAGEN) injection 1 mg  1 mg IntraMUSCular PRN    dextrose (D50) infusion 12.5-25 g  25-50 mL IntraVENous PRN    naloxone (NARCAN) injection 0.4 mg  0.4 mg IntraVENous PRN          Total time spent with patient: 35 minutes                  Care Plan discussed with: Patient, Care Manager, Nursing Staff and >50% of time spent in counseling and coordination of care    Discussed:  Care Plan    Prophylaxis:  Hep SQ    Disposition:  Home w/Family           This document in whole or part of it has been produced using voice recognition software. Unrecognized errors in transcription may be present.     Attending Physician: Danyel Fang MD

## 2020-10-06 LAB
ANION GAP SERPL CALC-SCNC: 7 MMOL/L (ref 3–18)
BASOPHILS # BLD: 0 K/UL (ref 0–0.1)
BASOPHILS NFR BLD: 0 % (ref 0–2)
BUN SERPL-MCNC: 41 MG/DL (ref 7–18)
BUN/CREAT SERPL: 15 (ref 12–20)
CALCIUM SERPL-MCNC: 8.4 MG/DL (ref 8.5–10.1)
CHLORIDE SERPL-SCNC: 114 MMOL/L (ref 100–111)
CO2 SERPL-SCNC: 20 MMOL/L (ref 21–32)
CREAT SERPL-MCNC: 2.66 MG/DL (ref 0.6–1.3)
DIFFERENTIAL METHOD BLD: ABNORMAL
EOSINOPHIL # BLD: 0.2 K/UL (ref 0–0.4)
EOSINOPHIL #/AREA URNS HPF: NEGATIVE /[HPF]
EOSINOPHIL NFR BLD: 4 % (ref 0–5)
ERYTHROCYTE [DISTWIDTH] IN BLOOD BY AUTOMATED COUNT: 15.9 % (ref 11.6–14.5)
GLUCOSE BLD STRIP.AUTO-MCNC: 117 MG/DL (ref 70–110)
GLUCOSE BLD STRIP.AUTO-MCNC: 127 MG/DL (ref 70–110)
GLUCOSE BLD STRIP.AUTO-MCNC: 133 MG/DL (ref 70–110)
GLUCOSE BLD STRIP.AUTO-MCNC: 161 MG/DL (ref 70–110)
GLUCOSE BLD STRIP.AUTO-MCNC: 174 MG/DL (ref 70–110)
GLUCOSE SERPL-MCNC: 128 MG/DL (ref 74–99)
HCT VFR BLD AUTO: 28.8 % (ref 35–45)
HGB BLD-MCNC: 8.6 G/DL (ref 12–16)
LYMPHOCYTES # BLD: 1.8 K/UL (ref 0.9–3.6)
LYMPHOCYTES NFR BLD: 30 % (ref 21–52)
MCH RBC QN AUTO: 23.4 PG (ref 24–34)
MCHC RBC AUTO-ENTMCNC: 29.9 G/DL (ref 31–37)
MCV RBC AUTO: 78.3 FL (ref 74–97)
MONOCYTES # BLD: 0.4 K/UL (ref 0.05–1.2)
MONOCYTES NFR BLD: 7 % (ref 3–10)
NEUTS SEG # BLD: 3.6 K/UL (ref 1.8–8)
NEUTS SEG NFR BLD: 59 % (ref 40–73)
PLATELET # BLD AUTO: 260 K/UL (ref 135–420)
PMV BLD AUTO: 11.1 FL (ref 9.2–11.8)
POTASSIUM SERPL-SCNC: 4.5 MMOL/L (ref 3.5–5.5)
RBC # BLD AUTO: 3.68 M/UL (ref 4.2–5.3)
SODIUM SERPL-SCNC: 141 MMOL/L (ref 136–145)
WBC # BLD AUTO: 6.1 K/UL (ref 4.6–13.2)

## 2020-10-06 PROCEDURE — 96372 THER/PROPH/DIAG INJ SC/IM: CPT

## 2020-10-06 PROCEDURE — 74011000250 HC RX REV CODE- 250: Performed by: INTERNAL MEDICINE

## 2020-10-06 PROCEDURE — 77030029684 HC NEB SM VOL KT MONA -A

## 2020-10-06 PROCEDURE — 2709999900 HC NON-CHARGEABLE SUPPLY

## 2020-10-06 PROCEDURE — 99218 HC RM OBSERVATION: CPT

## 2020-10-06 PROCEDURE — 87205 SMEAR GRAM STAIN: CPT

## 2020-10-06 PROCEDURE — 74011250636 HC RX REV CODE- 250/636: Performed by: INTERNAL MEDICINE

## 2020-10-06 PROCEDURE — 96375 TX/PRO/DX INJ NEW DRUG ADDON: CPT

## 2020-10-06 PROCEDURE — 74011250637 HC RX REV CODE- 250/637: Performed by: INTERNAL MEDICINE

## 2020-10-06 PROCEDURE — 85025 COMPLETE CBC W/AUTO DIFF WBC: CPT

## 2020-10-06 PROCEDURE — 80048 BASIC METABOLIC PNL TOTAL CA: CPT

## 2020-10-06 PROCEDURE — 96376 TX/PRO/DX INJ SAME DRUG ADON: CPT

## 2020-10-06 PROCEDURE — 82962 GLUCOSE BLOOD TEST: CPT

## 2020-10-06 RX ORDER — ALBUTEROL SULFATE 90 UG/1
2 AEROSOL, METERED RESPIRATORY (INHALATION) 2 TIMES DAILY
COMMUNITY

## 2020-10-06 RX ORDER — IPRATROPIUM BROMIDE AND ALBUTEROL SULFATE 2.5; .5 MG/3ML; MG/3ML
3 SOLUTION RESPIRATORY (INHALATION)
Status: DISCONTINUED | OUTPATIENT
Start: 2020-10-06 | End: 2020-10-07 | Stop reason: HOSPADM

## 2020-10-06 RX ORDER — PHENYTOIN 50 MG/1
100 TABLET, CHEWABLE ORAL 3 TIMES DAILY
Status: DISCONTINUED | OUTPATIENT
Start: 2020-10-06 | End: 2020-10-07 | Stop reason: HOSPADM

## 2020-10-06 RX ADMIN — PHENYTOIN 50 MG: 50 TABLET, CHEWABLE ORAL at 08:36

## 2020-10-06 RX ADMIN — Medication 10 ML: at 14:38

## 2020-10-06 RX ADMIN — HEPARIN SODIUM 5000 UNITS: 5000 INJECTION INTRAVENOUS; SUBCUTANEOUS at 12:44

## 2020-10-06 RX ADMIN — Medication 10 ML: at 05:15

## 2020-10-06 RX ADMIN — MORPHINE SULFATE 4 MG: 2 INJECTION, SOLUTION INTRAMUSCULAR; INTRAVENOUS at 08:46

## 2020-10-06 RX ADMIN — HYDRALAZINE HYDROCHLORIDE 10 MG: 20 INJECTION INTRAMUSCULAR; INTRAVENOUS at 12:49

## 2020-10-06 RX ADMIN — HEPARIN SODIUM 5000 UNITS: 5000 INJECTION INTRAVENOUS; SUBCUTANEOUS at 03:00

## 2020-10-06 RX ADMIN — HEPARIN SODIUM 5000 UNITS: 5000 INJECTION INTRAVENOUS; SUBCUTANEOUS at 18:40

## 2020-10-06 RX ADMIN — IPRATROPIUM BROMIDE AND ALBUTEROL SULFATE 3 ML: .5; 3 SOLUTION RESPIRATORY (INHALATION) at 01:00

## 2020-10-06 RX ADMIN — IPRATROPIUM BROMIDE AND ALBUTEROL SULFATE 3 ML: .5; 3 SOLUTION RESPIRATORY (INHALATION) at 08:54

## 2020-10-06 RX ADMIN — PHENYTOIN 100 MG: 50 TABLET, CHEWABLE ORAL at 17:07

## 2020-10-06 RX ADMIN — Medication 10 ML: at 21:54

## 2020-10-06 RX ADMIN — HYDRALAZINE HYDROCHLORIDE 25 MG: 50 TABLET, FILM COATED ORAL at 17:08

## 2020-10-06 RX ADMIN — SODIUM CHLORIDE, SODIUM LACTATE, POTASSIUM CHLORIDE, AND CALCIUM CHLORIDE 150 ML/HR: 600; 310; 30; 20 INJECTION, SOLUTION INTRAVENOUS at 07:39

## 2020-10-06 RX ADMIN — ASPIRIN 81 MG CHEWABLE TABLET 81 MG: 81 TABLET CHEWABLE at 08:36

## 2020-10-06 RX ADMIN — HYDRALAZINE HYDROCHLORIDE 25 MG: 50 TABLET, FILM COATED ORAL at 21:54

## 2020-10-06 RX ADMIN — SODIUM CHLORIDE, SODIUM LACTATE, POTASSIUM CHLORIDE, AND CALCIUM CHLORIDE 150 ML/HR: 600; 310; 30; 20 INJECTION, SOLUTION INTRAVENOUS at 14:40

## 2020-10-06 RX ADMIN — SODIUM CHLORIDE, SODIUM LACTATE, POTASSIUM CHLORIDE, AND CALCIUM CHLORIDE 150 ML/HR: 600; 310; 30; 20 INJECTION, SOLUTION INTRAVENOUS at 20:30

## 2020-10-06 RX ADMIN — TRAMADOL HYDROCHLORIDE 50 MG: 50 TABLET, FILM COATED ORAL at 21:53

## 2020-10-06 RX ADMIN — HYDRALAZINE HYDROCHLORIDE 25 MG: 50 TABLET, FILM COATED ORAL at 08:36

## 2020-10-06 RX ADMIN — NIFEDIPINE 90 MG: 60 TABLET, FILM COATED, EXTENDED RELEASE ORAL at 08:36

## 2020-10-06 RX ADMIN — PHENYTOIN 100 MG: 50 TABLET, CHEWABLE ORAL at 21:53

## 2020-10-06 NOTE — PROGRESS NOTES
conducted an initial consultation and Spiritual Assessment for Kar Sullivan, who is a 63 y.o.,female. Patients Primary Language is: [place]. According to the patients EMR Protestant Affiliation is: [facility].     The reason the Patient came to the hospital is:   Patient Active Problem List    Diagnosis Date Noted    JANNETTE (acute kidney injury) ([facility] [address] 10/04/2020    Obesity 10/04/2020    HTN (hypertension) 10/04/2020    Right loin pain 10/04/2020    Hyperkalemia 10/04/2020        The  provided the following Interventions:  Initiated a relationship of care and support with patient in room 2405 today. .  Listened empathically as patient talked about her various illnesses and now what is going on. Patient appeared very scared as she says that things are constantly getting worse with her asthma and stomach. Patient says that she wants to go home and not deal with this now but she knows that being here is her best option. Offered her a zoom visit with family but she says she sees them on face time . Provided information about Spiritual Care Services. Offered prayer and assurance of continued prayers on patients behalf. The following outcomes were achieved:  Patient shared limited information about her medical narrative. Patient processed feeling about current hospitalization. Patient expressed gratitude for pastoral care visit. Assessment:  Patient does not have any Protestant/cultural needs that will affect patients preferences in health care. There are no further spiritual or Protestant issues which require Spiritual Care Services interventions at this time. Plan:  Chaplains will continue to follow and will provide pastoral care on an as needed/requested basis    . Maximo Brunner   Spiritual Care   [phone]

## 2020-10-06 NOTE — PROGRESS NOTES
1930  Bedside, Verbal, and Written shift change report given to Marc Vera (oncoming nurse) by [address] (offgoing nurse). Report included the following information SBAR, Kardex, and MAR.     2000  Patient is in bed, alert and oriented. Oxygen on/ Room air, IV CDI, dressing CDI, no sign of distress. Bed low, call bell within reach. 2100  Patient is in bed, resting/ sleeping. No sign of distress. Bed low, call bell within reach. 2200  I can hear wheezing from patient while she was going to the bathroom. She said she has an inhaler in the closet. I inquired why she did not mention it when she gave me a list of her home medications. She said she did not think about it until it was brought by her family this morning. She said she takes 2 puffs twice a day. I let her take the puff and told her I will update her meds and let the provider know about this as well. 0000  Placed a new IV on right hand 24g. Pulled the IV on left hand d/t infiltration. Patient is anxious about needles and it took a long time to calm patient down. 0100  Administered prn duo-neb ordered by MD, patient expressed relief and wheezing subsided after reassessment post treatment. 0200  Patient is in bed, restless stating that she feels like \"burning\". Offered a clip fan, patient was very happy and stated relief.     0300  Changed patient gown, offered clean up declined. Patient stated that she had a cleap up yesterday during day shift and just needed a new gown. Massage back to relieve some pain. 0730  Bedside, Verbal, and Written shift change report given to [address] (oncoming nurse) by Marc Vera (offgoing nurse). Report included the following information SBAR, Kardex, and MAR.

## 2020-10-06 NOTE — PROGRESS NOTES
Patient received in bed awake. Patient A&Ox4, denies pain and discomfort. No distress noted. Frequently use items within reach. Bed locked in low position. Call bell within reach and Patient verbalized understanding of use for assistance and needs. 7206- Patient's /65, HR 73. Manually taken 168/92; see MAR for scheduled BP med's given. Dr. Tomer Urbina was called made aware; voiced understanding. 3404- Patient given Morphine 4 mg IV mg for right rib cage pain 9/10. See MAR and pain assessments. 4074- Patient c/o having wheezing. Noted audible prior to ausculation. Call bell w/in reach. 9981- Patient resting with no further c/o having wheezing. Call bell w/in reach. 5031- Patient called. Noted emesis to floor. Patient said \"it just came out. \" Denies having further nausea. Mouth care and hygiene assistance provided. Call bell w/in reach. 1- Dr. Tomer Urbina to bedside with this nurse made aware of Patient's episode of N&V; Morphine had been given prior; Abd distention and that Patient is voiding. 1140- Patient c/o having swelling to jaws. Denies tongue swelling and denies having difficulty swallowing. Call bell w/in reach. Prefect serve message since to Dr. Tomer Urbina; awaiting response. 1216- Patient's /87, HR 75. No c/o having HA or pain. Hydralazine 10 mg unavailable 2E pysix and unavailable with global search in 2 Central pysix. 1229- Patient awake. Manual /92. This nurse sent message to Pharmacy for Hydralazine refill. 1239- Patient denies having problems with jaws during this nurse assessing. Patient stated \"I bit my tongue, I think I had a seizure. \" Noted redness to left tongue with red drainage. Dr. Tomer Urbina was called; awaiting call back. 1249- Patient given prn Hydralazine 10 mg IV for /87, HR 75. Call bell w/in reach. 1322- Patient awake. F/u /90, HR 76. Call bell w/in reach.      80- Dr. Tomer Urbina return call made aware of Patient stating that she had a seizure with redness to left tongue and drainage. Also Patient stated that she takes Dilantin 100 mg three times a day, also noted in PTA listing. Dr. Geneva Edouard said for this nurse to call Phamacy for further assistance with Dilantin; Danyel Mast (Pharmacist) was called made aware said will address and will call Dr. Geneva Edouard. 1708- Patient sitting up at bedside. Given scheduled Hydralazine PO for /82. Call bell w/in reach.

## 2020-10-06 NOTE — PROGRESS NOTES
Problem: Falls - Risk of  Goal: *Absence of Falls  Description: Document Danay Brito Fall Risk and appropriate interventions in the flowsheet. Outcome: Progressing Towards Goal  Note: Fall Risk Interventions:            Medication Interventions: Bed/chair exit alarm         History of Falls Interventions: Bed/chair exit alarm         Problem: Patient Education: Go to Patient Education Activity  Goal: Patient/Family Education  Outcome: Progressing Towards Goal     Problem: Diabetes Self-Management  Goal: *Disease process and treatment process  Description: Define diabetes and identify own type of diabetes; list 3 options for treating diabetes. Outcome: Progressing Towards Goal  Goal: *Incorporating nutritional management into lifestyle  Description: Describe effect of type, amount and timing of food on blood glucose; list 3 methods for planning meals. Outcome: Progressing Towards Goal  Goal: *Incorporating physical activity into lifestyle  Description: State effect of exercise on blood glucose levels. Outcome: Progressing Towards Goal  Goal: *Developing strategies to promote health/change behavior  Description: Define the ABC's of diabetes; identify appropriate screenings, schedule and personal plan for screenings. Outcome: Progressing Towards Goal  Goal: *Using medications safely  Description: State effect of diabetes medications on diabetes; name diabetes medication taking, action and side effects. Outcome: Progressing Towards Goal  Goal: *Monitoring blood glucose, interpreting and using results  Description: Identify recommended blood glucose targets  and personal targets. Outcome: Progressing Towards Goal  Goal: *Prevention, detection, treatment of acute complications  Description: List symptoms of hyper- and hypoglycemia; describe how to treat low blood sugar and actions for lowering  high blood glucose level.   Outcome: Progressing Towards Goal  Goal: *Prevention, detection and treatment of chronic complications  Description: Define the natural course of diabetes and describe the relationship of blood glucose levels to long term complications of diabetes.   Outcome: Progressing Towards Goal  Goal: *Developing strategies to address psychosocial issues  Description: Describe feelings about living with diabetes; identify support needed and support network  Outcome: Progressing Towards Goal  Goal: *Insulin pump training  Outcome: Progressing Towards Goal  Goal: *Sick day guidelines  Outcome: Progressing Towards Goal  Goal: *Patient Specific Goal (EDIT GOAL, INSERT TEXT)  Outcome: Progressing Towards Goal     Problem: Patient Education: Go to Patient Education Activity  Goal: Patient/Family Education  Outcome: Progressing Towards Goal     Problem: Discharge Planning  Goal: *Discharge to safe environment  Outcome: Progressing Towards Goal

## 2020-10-07 VITALS
WEIGHT: 241 LBS | BODY MASS INDEX: 42.7 KG/M2 | TEMPERATURE: 97.5 F | RESPIRATION RATE: 18 BRPM | HEIGHT: 63 IN | DIASTOLIC BLOOD PRESSURE: 72 MMHG | HEART RATE: 61 BPM | SYSTOLIC BLOOD PRESSURE: 146 MMHG | OXYGEN SATURATION: 97 %

## 2020-10-07 LAB
ANION GAP SERPL CALC-SCNC: 6 MMOL/L (ref 3–18)
BASOPHILS # BLD: 0 K/UL (ref 0–0.1)
BASOPHILS NFR BLD: 0 % (ref 0–2)
BUN SERPL-MCNC: 32 MG/DL (ref 7–18)
BUN/CREAT SERPL: 13 (ref 12–20)
CALCIUM SERPL-MCNC: 8.7 MG/DL (ref 8.5–10.1)
CHLORIDE SERPL-SCNC: 115 MMOL/L (ref 100–111)
CO2 SERPL-SCNC: 22 MMOL/L (ref 21–32)
CREAT SERPL-MCNC: 2.38 MG/DL (ref 0.6–1.3)
DIFFERENTIAL METHOD BLD: ABNORMAL
EOSINOPHIL # BLD: 0.4 K/UL (ref 0–0.4)
EOSINOPHIL NFR BLD: 5 % (ref 0–5)
ERYTHROCYTE [DISTWIDTH] IN BLOOD BY AUTOMATED COUNT: 16 % (ref 11.6–14.5)
GLUCOSE BLD STRIP.AUTO-MCNC: 121 MG/DL (ref 70–110)
GLUCOSE BLD STRIP.AUTO-MCNC: 92 MG/DL (ref 70–110)
GLUCOSE BLD STRIP.AUTO-MCNC: 99 MG/DL (ref 70–110)
GLUCOSE SERPL-MCNC: 195 MG/DL (ref 74–99)
HCT VFR BLD AUTO: 30.6 % (ref 35–45)
HGB BLD-MCNC: 9.1 G/DL (ref 12–16)
LYMPHOCYTES # BLD: 1.4 K/UL (ref 0.9–3.6)
LYMPHOCYTES NFR BLD: 21 % (ref 21–52)
MCH RBC QN AUTO: 23.3 PG (ref 24–34)
MCHC RBC AUTO-ENTMCNC: 29.7 G/DL (ref 31–37)
MCV RBC AUTO: 78.5 FL (ref 74–97)
MONOCYTES # BLD: 0.3 K/UL (ref 0.05–1.2)
MONOCYTES NFR BLD: 4 % (ref 3–10)
NEUTS SEG # BLD: 4.8 K/UL (ref 1.8–8)
NEUTS SEG NFR BLD: 70 % (ref 40–73)
PLATELET # BLD AUTO: 240 K/UL (ref 135–420)
PMV BLD AUTO: 11.1 FL (ref 9.2–11.8)
POTASSIUM SERPL-SCNC: 4.9 MMOL/L (ref 3.5–5.5)
RBC # BLD AUTO: 3.9 M/UL (ref 4.2–5.3)
SODIUM SERPL-SCNC: 143 MMOL/L (ref 136–145)
WBC # BLD AUTO: 6.9 K/UL (ref 4.6–13.2)

## 2020-10-07 PROCEDURE — 36415 COLL VENOUS BLD VENIPUNCTURE: CPT

## 2020-10-07 PROCEDURE — 85025 COMPLETE CBC W/AUTO DIFF WBC: CPT

## 2020-10-07 PROCEDURE — 99218 HC RM OBSERVATION: CPT

## 2020-10-07 PROCEDURE — 74011250636 HC RX REV CODE- 250/636: Performed by: INTERNAL MEDICINE

## 2020-10-07 PROCEDURE — 74011250637 HC RX REV CODE- 250/637: Performed by: INTERNAL MEDICINE

## 2020-10-07 PROCEDURE — 2709999900 HC NON-CHARGEABLE SUPPLY

## 2020-10-07 PROCEDURE — 96372 THER/PROPH/DIAG INJ SC/IM: CPT

## 2020-10-07 PROCEDURE — 80048 BASIC METABOLIC PNL TOTAL CA: CPT

## 2020-10-07 PROCEDURE — 96376 TX/PRO/DX INJ SAME DRUG ADON: CPT

## 2020-10-07 PROCEDURE — 82962 GLUCOSE BLOOD TEST: CPT

## 2020-10-07 RX ORDER — CLONIDINE HYDROCHLORIDE 0.1 MG/1
0.1 TABLET ORAL
Status: COMPLETED | OUTPATIENT
Start: 2020-10-07 | End: 2020-10-07

## 2020-10-07 RX ORDER — HYDRALAZINE HYDROCHLORIDE 50 MG/1
50 TABLET, FILM COATED ORAL 3 TIMES DAILY
Qty: 90 TAB | Refills: 0 | Status: SHIPPED | OUTPATIENT
Start: 2020-10-07 | End: 2020-11-06

## 2020-10-07 RX ORDER — AMLODIPINE BESYLATE 5 MG/1
5 TABLET ORAL DAILY
Status: DISCONTINUED | OUTPATIENT
Start: 2020-10-07 | End: 2020-10-07 | Stop reason: HOSPADM

## 2020-10-07 RX ORDER — AMLODIPINE BESYLATE 5 MG/1
5 TABLET ORAL DAILY
Qty: 30 TAB | Refills: 0 | Status: SHIPPED | OUTPATIENT
Start: 2020-10-07 | End: 2020-11-06

## 2020-10-07 RX ADMIN — Medication 10 ML: at 14:00

## 2020-10-07 RX ADMIN — NIFEDIPINE 90 MG: 60 TABLET, FILM COATED, EXTENDED RELEASE ORAL at 09:47

## 2020-10-07 RX ADMIN — HYDRALAZINE HYDROCHLORIDE 10 MG: 20 INJECTION INTRAMUSCULAR; INTRAVENOUS at 14:40

## 2020-10-07 RX ADMIN — ACETAMINOPHEN 1000 MG: 500 TABLET, FILM COATED ORAL at 08:14

## 2020-10-07 RX ADMIN — PHENYTOIN 100 MG: 50 TABLET, CHEWABLE ORAL at 16:36

## 2020-10-07 RX ADMIN — Medication 10 ML: at 05:09

## 2020-10-07 RX ADMIN — HYDRALAZINE HYDROCHLORIDE 25 MG: 50 TABLET, FILM COATED ORAL at 09:48

## 2020-10-07 RX ADMIN — AMLODIPINE BESYLATE 5 MG: 5 TABLET ORAL at 13:34

## 2020-10-07 RX ADMIN — CLONIDINE HYDROCHLORIDE 0.1 MG: 0.1 TABLET ORAL at 18:17

## 2020-10-07 RX ADMIN — PHENYTOIN 100 MG: 50 TABLET, CHEWABLE ORAL at 09:47

## 2020-10-07 RX ADMIN — HEPARIN SODIUM 5000 UNITS: 5000 INJECTION INTRAVENOUS; SUBCUTANEOUS at 05:07

## 2020-10-07 RX ADMIN — ASPIRIN 81 MG CHEWABLE TABLET 81 MG: 81 TABLET CHEWABLE at 09:46

## 2020-10-07 RX ADMIN — HYDRALAZINE HYDROCHLORIDE 25 MG: 50 TABLET, FILM COATED ORAL at 16:37

## 2020-10-07 NOTE — PROGRESS NOTES
Received pt alert and verbal and oriented x 4, in pleasant spirits. She is ambulating to bathroom and voiding without difficulty. No s/s of distress noted and no complaints voiced at this time. Clothing items retrieved from downstairs and given to patient. Will continue to monitor. 0720 Bedside and Verbal shift change report given to Dennis (oncoming nurse) by Ahmet Molina (offgoing nurse). Report included the following information SBAR, Kardex and MAR.

## 2020-10-07 NOTE — DISCHARGE SUMMARY
Discharge Summary      [address] service    Patient ID:  Meg Gonzalez, 63 y.o., female  : 1960    Admit Date: 10/4/2020  Discharge Date:  10/7/2020  Length of stay: 1 day(s)    PCP:  Shanita Forrest MD    Chief Complaint   Patient presents with    Flank Pain     Discharge Diagnosis:     Hospital Problems  Never Reviewed          Codes Class Noted POA    * (Principal) JANNETTE (acute kidney injury) ([facility] [address] ICD-10-CM: N17.9  ICD-9-CM: 584.9  10/4/2020 Yes        Obesity ICD-10-CM: E66.9  ICD-9-CM: 278.00  10/4/2020 Yes        HTN (hypertension) ICD-10-CM: I10  ICD-9-CM: 401.9  10/4/2020 Yes        Right loin pain ICD-10-CM: M54.5  ICD-9-CM: 789.00  10/4/2020 Yes        Hyperkalemia ICD-10-CM: E87.5  ICD-9-CM: 276.7  10/4/2020 Yes              Discharge instructions:    #  Discharge Diet:            Diet: Cardiac Diet and Diabetic Diet  # Discharge activity and restrictions: Activity as tolerated    # Follow-up appointments: Follow-up Information     Follow up With Specialties Details Why Contact Info    Shanita Forrest MD Family Medicine Schedule an appointment as soon as possible for a visit [facility] [address] [facility], [address] [address]  [phone]              HPI on Admission (per admitting physician):  Ms. Anel Lazo is a 63 y.o.  female who is admitted for JANNETTE. Ms. Anel Lazo with past medical history as noted below , presented to the Emergency Department today  complaining of above. Triage and ER notes noted. ER MD wanted to admit the patient to the hospital for further management and called hospitalist to facilitate this process. Patient had a fall days ago ,4 days ago started to have R loin/ lower ribs pains. Deny smoking , deny alcohol or substances. Came for check up and S.cr increased significantly .  Report good UOP, normal and no changes. Use about 4 Aleve tabs a day for her pains. Because of her R loin pains ER MD sent her for UA/CT abdomen and came with no acute lesion . Deny fever. Report she was drinking good amount of water . For further details and initial management please refer to H/P. Hospital Course:      Patient does not want to wait to see the nephrologist in consultation and asked to be released today otherwise she will sign AMA, report she already booked to see her MD in am. I DW nephrologist on call, he said if want to go home and follow with her nephrologist its will be ok. Her DM, BP medications adjusted and need further follow up with her MDs. Her R lower ribs pains improved. Advised not to use NSAID or any medications without consultations with her PCP/MDs. By Problems :     #  JANNETTE (acute kidney injury) ([facility] [address] (10/4/2020). Likely due to NSAID on top of DM, ARB, HTN , obesity , ? Compliance with diet and medications  and dyslipidemia. Improving slowly. Nephrology consulted. Possible new baseline . Treated with IVF.  Monitor Renal function and other labs as indicated. Avoid nephrotoxins , iv Contrast, NSAID. Renally dosing medications. Monitor urine out put.  Avoid fleets enemas due to concern for acute phosphate nephropathy.      #   Morbid Obesity (10/4/2020). Body mass index is 42.69 kg/m². urged to diet , life style changes and wt loss.         #   HTN (hypertension) (10/4/2020) Hold ARB for now. Control BP . Regimen adjusted.       #  Right Lower ribs tenderness, XR  . Had a fall recently. Control pains , no NSAID     #   Hyperkalemia (10/4/2020). Mild - improved.   follow . Hold ARB and no  K supplements .      Discharge condition:  stable    Disposition:  Home    Procedures:   * No surgery found *      Consultants:   IP CONSULT TO NEPHROLOGY    Physical Exam on Discharge:  Visit Vitals  BP (!) 174/90 Comment: rn notified   Pulse 86   Temp 98.1 °F (36.7 °C)   Resp 18   Ht 5' 3\" (1.6 m)   Wt 109.3 kg (241 lb)   SpO2 100%   Breastfeeding No   BMI 42.69 kg/m²   Gen: Noemy Paz stated age, Well-developed, in no acute distress  HEENT: significant loss of teeth ,  Head atraumatic, normocephalic , hearing intact to voice, moist  mucous membranes. Neck:   Trachea midline , No apparent JVD, Supple, without masses, thyroid non-tender  Resp:  No accessory muscle use,Bilateral BS present, clear breath sounds without wheezes rales or rhonchi  Card:    normal S1, S2 without Gallop . No Significant lower leg peripheral edema. Abd:  Soft, non-tender, non-distended, bowel sounds are present    Musc:  No cyanosis or clubbing. Skin:   Warm and dry . No rashes or ulcers, skin turgor is good. Neuro:  Cranial nerves are grossly intact, no clear area of focal motor weakness, follows commands appropriately. Psych:    oriented to person, place and time, alert. Hospitalization and discharge instructions d/c in details with : patient , Nephrology  , Nursing/CM     Discharge medications :  Current Discharge Medication List      START taking these medications    Details   amLODIPine (NORVASC) 5 mg tablet Take 1 Tab by mouth daily for 30 days. Qty: 30 Tab, Refills: 0         CONTINUE these medications which have CHANGED    Details   hydrALAZINE (APRESOLINE) 50 mg tablet Take 1 Tab by mouth three (3) times daily for 30 days. Qty: 90 Tab, Refills: 0         CONTINUE these medications which have NOT CHANGED    Details   albuterol (PROVENTIL HFA, VENTOLIN HFA, PROAIR HFA) 90 mcg/actuation inhaler Take 2 Puffs by inhalation two (2) times a day. SITagliptin (Januvia) 25 mg tablet Take 25 mg by mouth daily. Indications: type 2 diabetes mellitus      aspirin 81 mg chewable tablet Take 81 mg by mouth daily. Omeprazole delayed release (PRILOSEC D/R) 20 mg tablet Take 20 mg by mouth daily. atorvastatin (LIPITOR) 40 mg tablet Take 40 mg by mouth daily. omeprazole (PRILOSEC) 20 mg capsule Take 40 mg by mouth daily. phenytoin (DILANTIN) 50 mg chewable tablet Take 100 mg by mouth three (3) times daily. acetaminophen (TYLENOL EXTRA STRENGTH) 500 mg tablet Take 2 Tabs by mouth every six (6) hours as needed for Pain. Qty: 50 Tab, Refills: 0      traMADol (ULTRAM) 50 mg tablet Take 1 Tab by mouth every six (6) hours as needed for Pain. Max Daily Amount: 200 mg. Qty: 8 Tab, Refills: 0    Associated Diagnoses: Left arm pain         STOP taking these medications       metFORMIN (GLUCOPHAGE) 500 mg tablet Comments:   Reason for Stopping:         chlorthalidone (HYGROTEN) 25 mg tablet Comments:   Reason for Stopping:              Most Recent Labs:       Recent Labs     10/07/20  0855 10/06/20  0300 10/05/20  0220   WBC 6.9 6.1 7.7   HGB 9.1* 8.6* 9.2*   HCT 30.6* 28.8* 30.8*    260 270     Recent Labs     10/07/20  0855 10/06/20  0300 10/05/20  0220    141 140   K 4.9 4.5 5.1   * 114* 113*   CO2 22 20* 20*   * 128* 103*   BUN 32* 41* 43*   CREA 2.38* 2.66* 2.87*   CA 8.7 8.4* 8.2*   MG  --   --  1.8   PHOS  --   --  4.7     Lab Results   Component Value Date/Time    Glucose (POC) 92 10/07/2020 11:22 AM    Glucose (POC) 121 (H) 10/07/2020 08:24 AM    Glucose (POC) 174 (H) 10/06/2020 09:46 PM    Glucose (POC) 161 (H) 10/06/2020 09:44 PM    Glucose (POC) 117 (H) 10/06/2020 05:11 PM     No results for input(s): PH, PCO2, PO2, HCO3, FIO2 in the last 72 hours. No results for input(s): INR, INREXT in the last 72 hours. No results found for: SDES  No results found for: CULT    All Cardiac Markers in the last 24 hours: No results found for: CPK, CK, CKMMB, CKMB, RCK3, CKMBT, CKNDX, CKND1, ERIK, TROPT, TROIQ, IRA, TROPT, TNIPOC, BNP, BNPP    XR Results:  Results from Hospital Encounter encounter on 10/04/20   XR RIBS RT UNI 2 V    Narrative Exam: Right ribs     History: Tender right lower ribs status post fall    Comparison:  Chest radiograph 10/4/2020. Findings:     Three views of the right sided ribs were obtained. No displaced rib fracture is  seen. No focal consolidation, pneumothorax, or pleural effusion is seen within  the included right lung. Mild thoracic spondylosis. Impression Impression:  No right sided rib fracture visualized. Attending radiologist statement: I have personally reviewed the study and this  report, and concur with the above stated findings. CT Results:  Results from [place] encounter on 10/04/20   CT ABD PELV WO CONT    Narrative EXAM: CT of the abdomen and pelvis    INDICATION: Flank pain    COMPARISON: None    TECHNIQUE: Helical volumetric scanning through the abdomen and pelvis was  performed without intravenous contrast.  Axial, coronal and sagittal  reconstructions were created. One or more dose reduction techniques were used on  this CT: automated exposure control, adjustment of the mAs and/or kVp according  to patient size, and iterative reconstruction techniques. The specific  techniques used on this CT exam have been documented in the patient's electronic  medical record. Digital Imaging and Communications in Medicine (DICOM) format  image data are available to nonaffiliated external healthcare facilities or  entities on a secure, media free, reciprocally searchable basis with patient  authorization for at least a 12-month period after this study. _______________    FINDINGS:    LOWER CHEST: Within normal limits. LIVER, BILIARY: 2.4 cm smooth rounded water attenuation lesion in the  subcapsular posterior right hepatic lobe is very likely a benign cyst. No  suspicious liver lesions. No biliary dilation. Gallbladder is unremarkable. PANCREAS: Normal.    SPLEEN: Normal.    ADRENALS: Slight fullness of the bilateral adrenal glands, likely benign,  probably hyperplasia. KIDNEYS: No renal or ureteral calculi. No signs of obstructive uropathy. Renal  parenchyma appears normal for noncontrast technique.     LYMPH NODES: No enlarged lymph nodes.    GASTROINTESTINAL TRACT: Sigmoid colon diverticulosis is present without signs of  diverticulitis. No bowel dilatation or wall thickening. Normal appendix. PELVIC ORGANS: Hysterectomy. Urinary bladder is nearly completely empty with  apparent wall thickening probably due to its decompressed state. VASCULATURE: Scattered calcific atherosclerosis is present. No AAA. BONES: No acute or aggressive osseous abnormalities identified. OTHER: Small fat-containing umbilical hernia.    _______________      Impression IMPRESSION:      1. No renal or ureteral calculi. No signs of obstructive uropathy. 2. Nearly completely empty urinary bladder with bladder wall thickening probably  due to its decompressed state. Cystitis cannot be completely excluded. 3. Colonic diverticulosis without signs of diverticulitis. 4. 2.4 cm water density lesion in the right hepatic lobe, likely a benign cyst.    A preliminary report was provided by the radiology resident on call at the time  of the study. MRI Results:  No results found for this or any previous visit. Nuclear Medicine Results:  No results found for this or any previous visit. US Results:  Results from [place] encounter on 10/04/20   US ABD LTD    Narrative EXAM: Limited right upper quadrant abdominal ultrasound    INDICATION: Right upper quadrant pain. COMPARISON: Right upper quadrant abdominal pain    TECHNIQUE: Real-time sonography in multiple planes of the [right upper quadrant  was performed with image documentation. Grayscale, color flow Doppler imaging,  and velocity spectral waveform analysis of the portal vein was performed (duplex  imaging). ]    _______________    FINDINGS:    PANCREAS: The pancreatic head and body are normal, the pancreatic tail was  incompletely visualized due to shadowing from overlying bowel gas. LIVER: Normal in echotexture. No focal mass.  Color flow Doppler and velocity  spectral waveform analysis of the portal vein shows normal (hepatopedal)  direction of flow. Portal vein caliber measures 8 mm. BILIARY SYSTEM: No intrahepatic biliary dilatation. Common bile duct is normal  in caliber measuring 4 mm. GALLBLADDER: No gallbladder filling defects, wall thickening or pericholecystic  fluid. The technologist reports absence of a sonographic Tang sign. RIGHT KIDNEY: 9.9 cm in length. No hydronephrosis or renal mass. No renal  calculi. Right renal echotexture is similar to the adjacent liver. IVC: Visualized portions are unremarkable in appearance. OTHER: No ascites. _______________      Impression IMPRESSION:    Negative sonographic assessment of the gallbladder and biliary tree. Mildly increased right renal echotexture, possibly medical renal disease. A preliminary report was provided by the radiology resident on call at the time  of the study. IR Results:  No results found for this or any previous visit. VAS/US Results:  No results found for this or any previous visit. Total discharge time 35 minutes of which more than 50 % spent on counseling and coordination of care. This document in whole or part of it has been produced using voice recognition software. Unrecognized errors in transcription may be present. Sam Navarro MD  Hospitalist  [facility]. medical group. Hospitalist Division.   October 7, 2020  12:52 PM

## 2020-10-07 NOTE — PROGRESS NOTES
Internal Medicine Progress Note        NAME: Wan Peguero   :  1960  MRM:  [id number]    Date/Time: 10/7/2020        ASSESSMENT/PLAN:    #  JANNETTE (acute kidney injury) ([facility] (10/4/2020). Likely due to NSAID on top of DM, ARB, HTN , obesity , ? Compliance with diet and medications  and dyslipidemia. Improving slowly. Nephrology consulted. Possible new baseline   Continue    IVF. Monitor Renal function and other labs as indicated. Avoid nephrotoxins , iv Contrast, NSAID. Renally dosing medications. Monitor urine out put. Avoid fleets enemas due to concern for acute phosphate nephropathy.      #   Morbid Obesity (10/4/2020). Body mass index is 42.69 kg/m².        #   HTN (hypertension) (10/4/2020) Hold ARB for now. Control BP       #  Right Lower ribs tenderness, XR  . Had a fall recently. Control pains , no NSAID     #   Hyperkalemia (10/4/2020). Mild- improved. follow .  Hold ARB and no  K supplements           # Continue home regimen / Medications when appropriate.      -DVT prophylaxis :  heparin.   - Code Status : FULL    Principal Problem:    JANNETTE (acute kidney injury) ([facility] (10/4/2020)    Active Problems:    Obesity (10/4/2020)      HTN (hypertension) (10/4/2020)      Right loin pain (10/4/2020)      Hyperkalemia (10/4/2020)             Lab Review:     Recent Labs     10/07/20  0855 10/06/20  0300 10/05/20  0220   WBC 6.9 6.1 7.7   HGB 9.1* 8.6* 9.2*   HCT 30.6* 28.8* 30.8*    260 270     Recent Labs     10/07/20  0855 10/06/20  0300 10/05/20  0220    141 140   K 4.9 4.5 5.1   * 114* 113*   CO2 22 20* 20*   * 128* 103*   BUN 32* 41* 43*   CREA 2.38* 2.66* 2.87*   CA 8.7 8.4* 8.2*   MG  --   --  1.8   PHOS  --   --  4.7     Lab Results   Component Value Date/Time    Glucose (POC) 92 10/07/2020 11:22 AM    Glucose (POC) 121 (H) 10/07/2020 08:24 AM    Glucose (POC) 174 (H) 10/06/2020 09:46 PM    Glucose (POC) 161 (H) 10/06/2020 09:44 PM    Glucose (POC) 117 (H) 10/06/2020 05:11 PM     No results for input(s): PH, PCO2, PO2, HCO3, FIO2 in the last 72 hours. No results for input(s): INR, INREXT, INREXT in the last 72 hours. No results found for: SDES  No results found for: CULT    All Cardiac Markers in the last 24 hours: No results found for: CPK, CK, CKMMB, CKMB, RCK3, CKMBT, CKNDX, CKND1, ERIK, TROPT, TROIQ, IRA, TROPT, TNIPOC, BNP, BNPP        Intervals noted   Pt s/e @ bedside     Subjective:     Chief Complaint:      R LOWER RIBS PAIN much improved  Urinating normal  Eager to go home    ROS:  (bold if positive,otherwise negative)    Fever/chills ,  Dysuria   Cough , Sputum , SOB/BONNER , Chest Pain     Diarrhea ,Nausea/Vomit , Abd Pain , Constipation     Tolerating Diet                Objective:     Vitals:  Last 24hrs VS reviewed since prior progress note. Most recent are:    Visit Vitals  BP (!) 174/90   Pulse 86   Temp 98.1 °F (36.7 °C)   Resp 18   Ht 5' 3\" (1.6 m)   Wt 109.3 kg (241 lb)   SpO2 100%   Breastfeeding No   BMI 42.69 kg/m²     SpO2 Readings from Last 6 Encounters:   10/07/20 100%   07/11/18 97%            Intake/Output Summary (Last 24 hours) at 10/7/2020 1151  Last data filed at 10/7/2020 0853  Gross per 24 hour   Intake 3982.5 ml   Output 4000 ml   Net -17.5 ml          Physical Exam:   Gen:  Appear stated age, Well-developed, in no acute distress  HEENT: significant loss of teeth,   Head atraumatic, normocephalic , hearing intact to voice, moist  mucous membranes. Neck:   Trachea midline , No apparent JVD, Supple, without masses, thyroid non-tender  Resp:  No accessory muscle use,Bilateral BS present, clear breath sounds without wheezes rales or rhonchi  Card:    normal S1, S2 without Gallop . No Significant lower leg peripheral edema. Abd:  Soft, non-tender, non-distended, bowel sounds are present    Musc:  No cyanosis or clubbing. Skin:   Warm and dry . No rashes or ulcers, skin turgor is good.    Neuro:  Cranial nerves are grossly intact, no clear area of focal motor weakness, follows commands appropriately. Psych:    oriented to person, place and time, alert.     Medications Reviewed: (see below)    Lab Data Reviewed: (see below)    ______________________________________________________________________    Medications:     Current Facility-Administered Medications   Medication Dose Route Frequency    albuterol-ipratropium (DUO-NEB) 2.5 MG-0.5 MG/3 ML  3 mL Nebulization Q4H PRN    phenytoin (DILANTIN) chewable tablet 100 mg  100 mg Oral TID    atorvastatin (LIPITOR) tablet 40 mg  40 mg Oral QHS    aspirin chewable tablet 81 mg  81 mg Oral DAILY    hydrALAZINE (APRESOLINE) tablet 25 mg  25 mg Oral TID    pantoprazole (PROTONIX) injection 40 mg  40 mg IntraVENous DAILY PRN    lidocaine 4 % patch 2 Patch  2 Patch TransDERmal Q24H    morphine injection 2-4 mg  2-4 mg IntraVENous Q3H PRN    NIFEdipine ER (PROCARDIA XL) tablet 90 mg  90 mg Oral DAILY    hydrALAZINE (APRESOLINE) 20 mg/mL injection 10 mg  10 mg IntraVENous Q6H PRN    acetaminophen (TYLENOL) tablet 1,000 mg  1,000 mg Oral Q6H PRN    traMADoL (ULTRAM) tablet 50 mg  50 mg Oral Q6H PRN    sodium chloride (NS) flush 5-40 mL  5-40 mL IntraVENous Q8H    sodium chloride (NS) flush 5-40 mL  5-40 mL IntraVENous PRN    lactated Ringers infusion  150 mL/hr IntraVENous CONTINUOUS    heparin (porcine) injection 5,000 Units  5,000 Units SubCUTAneous Q8H    insulin lispro (HUMALOG) injection   SubCUTAneous TIDAC    glucose chewable tablet 16 g  4 Tab Oral PRN    glucagon (GLUCAGEN) injection 1 mg  1 mg IntraMUSCular PRN    dextrose (D50) infusion 12.5-25 g  25-50 mL IntraVENous PRN    naloxone (NARCAN) injection 0.4 mg  0.4 mg IntraVENous PRN          Total time spent with patient: 35 minutes                  Care Plan discussed with: Patient, Care Manager, Nursing Staff, Consultant/Specialist and >50% of time spent in counseling and coordination of care    Discussed:  Care Plan    Prophylaxis:  Hep SQ    Disposition:  Home w/Family           This document in whole or part of it has been produced using voice recognition software. Unrecognized errors in transcription may be present.     Attending Physician: Cat Kelly MD

## 2020-10-07 NOTE — PROGRESS NOTES
Problem: Falls - Risk of  Goal: *Absence of Falls  Description: Document Shanita Travis Fall Risk and appropriate interventions in the flowsheet. Outcome: Progressing Towards Goal  Note: Fall Risk Interventions:            Medication Interventions: Patient to call before getting OOB         History of Falls Interventions: Door open when patient unattended         Problem: Patient Education: Go to Patient Education Activity  Goal: Patient/Family Education  Outcome: Progressing Towards Goal     Problem: Diabetes Self-Management  Goal: *Disease process and treatment process  Description: Define diabetes and identify own type of diabetes; list 3 options for treating diabetes. Outcome: Progressing Towards Goal  Goal: *Incorporating nutritional management into lifestyle  Description: Describe effect of type, amount and timing of food on blood glucose; list 3 methods for planning meals. Outcome: Progressing Towards Goal  Goal: *Developing strategies to promote health/change behavior  Description: Define the ABC's of diabetes; identify appropriate screenings, schedule and personal plan for screenings. Outcome: Progressing Towards Goal  Goal: *Using medications safely  Description: State effect of diabetes medications on diabetes; name diabetes medication taking, action and side effects. Outcome: Progressing Towards Goal  Goal: *Monitoring blood glucose, interpreting and using results  Description: Identify recommended blood glucose targets  and personal targets. Outcome: Progressing Towards Goal  Goal: *Prevention, detection, treatment of acute complications  Description: List symptoms of hyper- and hypoglycemia; describe how to treat low blood sugar and actions for lowering  high blood glucose level.   Outcome: Progressing Towards Goal  Goal: *Prevention, detection and treatment of chronic complications  Description: Define the natural course of diabetes and describe the relationship of blood glucose levels to long term complications of diabetes.   Outcome: Progressing Towards Goal

## 2020-10-07 NOTE — PROGRESS NOTES
Problem: Discharge Planning  Goal: *Discharge to safe environment  Outcome: Resolved/Met     Home    Noted orders for discharge. No services ordered. Available as needed. Antionette CrockerRN,ext [phone]. Care Management Interventions  PCP Verified by CM: Yes  Palliative Care Criteria Met (RRAT>21 & CHF Dx)?: No  Mode of Transport at Discharge:  Other (see comment)( to drive)  Transition of Care Consult (CM Consult): Discharge Planning  Current Support Network: Lives with Spouse  Confirm Follow Up Transport: Family  The Plan for Transition of Care is Related to the Following Treatment Goals : resolution of acute symptoms  Discharge Location  Discharge Placement: Home

## 2020-10-07 NOTE — ROUTINE PROCESS
Bedside shift report receivedfrom Jesus Darling with sbar Dr. Raymond Gonzales in to see patient Lab drawn Patient stated she wants to sign out AMA and has appt. With her doctor in am 
Dr. Ilsa Laird was notified of patient wishes to sign out AMA. Stated he will discharge patient Hydralazine 10 mg. Given for elevated bp 
bp 155/60 Dr. Ilsa Laird notified, order obtained Bedside shift report given to Caitlin Nichols with [facility]

## 2020-10-08 NOTE — DISCHARGE INSTRUCTIONS
DISCHARGE SUMMARY from Nurse    PATIENT INSTRUCTIONS:    After general anesthesia or intravenous sedation, for 24 hours or while taking prescription Narcotics:  · Limit your activities  · Do not drive and operate hazardous machinery  · Do not make important personal or business decisions  · Do  not drink alcoholic beverages  · If you have not urinated within 8 hours after discharge, please contact your surgeon on call. Report the following to your surgeon:  · Excessive pain, swelling, redness or odor of or around the surgical area  · Temperature over 100.5  · Nausea and vomiting lasting longer than 4 hours or if unable to take medications  · Any signs of decreased circulation or nerve impairment to extremity: change in color, persistent  numbness, tingling, coldness or increase pain  · Any questions    What to do at Home:  Recommended activity: {discharge activity:06030},as tolerated    If you experience any of the following symptoms seizures or elebvated blood pressure  *  Please give a list of your current medications to your Primary Care Provider. *  Please update this list whenever your medications are discontinued, doses are      changed, or new medications (including over-the-counter products) are added. *  Please carry medication information at all times in case of emergency situations. These are general instructions for a healthy lifestyle:    No smoking/ No tobacco products/ Avoid exposure to second hand smoke  Surgeon General's Warning:  Quitting smoking now greatly reduces serious risk to your health.     Obesity, smoking, and sedentary lifestyle greatly increases your risk for illness    A healthy diet, regular physical exercise & weight monitoring are important for maintaining a healthy lifestyle    You may be retaining fluid if you have a history of heart failure or if you experience any of the following symptoms:  Weight gain of 3 pounds or more overnight or 5 pounds in a week, increased swelling in our hands or feet or shortness of breath while lying flat in bed. Please call your doctor as soon as you notice any of these symptoms; do not wait until your next office visit. The discharge information has been reviewed with the patient. The patient verbalized understanding. Discharge medications reviewed with the patient and appropriate educational materials and side effects teaching were provided.   ___________________________________________________________________________________________________________________________________

## 2020-10-08 NOTE — PROGRESS NOTES
Received pt alert and verbal and in pleasant spirits . She is awaiting discharge. BP rechecked at this time and noted to be 146/72. No complaints voiced. Offgoing nurse working on d/c paperwork. PTs IV removed. Discharge instructions given. Pt stable for discharge. Taken down in wheelchair.

## 2020-10-14 NOTE — PROGRESS NOTES
Internal Medicine Progress Note        NAME: Stanley Mackey   :  1960  MRM:  [id number]    Date/Time: 10/13/2020      LATE ENTERY FOR SERVICE DATE 10/6/06713. ASSESSMENT/PLAN:    Renal function still improving slowly  . Pain is better but still there and needing pain medicine. Continue current regimen. S.cr 2.66    #  JANNETTE (acute kidney injury) ([address] (10/4/2020). Likely due to NSAID. Improving slowly with  IVF. Monitor Renal function and other labs as indicated. Avoid nephrotoxins , iv Contrast, NSAID. Renally dosing medications. Monitor urine out put. Avoid fleets enemas due to concern for acute phosphate nephropathy.      #   Obesity (10/4/2020). Body mass index is 42.69 kg/m².        #   HTN (hypertension) (10/4/2020) Hold ARB for now. Control BP       #  Right Lower ribs tenderness, XR reported No right sided rib fracture visualized. .   Had a fall recently. Control pains , no NSAID.     #   Hyperkalemia (10/4/2020). Mild- improved. follow . Hold ARB and no  K supplements           # Continue home regimen / Medications when appropriate.      -DVT prophylaxis :  heparin.   - Code Status : FULL       Lab Review:     No results for input(s): WBC, HGB, HCT, PLT, HGBEXT, HCTEXT, PLTEXT, HGBEXT, HCTEXT, PLTEXT in the last 72 hours. No results for input(s): NA, K, CL, CO2, GLU, BUN, CREA, CA, MG, PHOS, ALB, TBIL, TBILI, ALT, INR, INREXT, INREXT in the last 72 hours. No lab exists for component: SGOT  Lab Results   Component Value Date/Time    Glucose (POC) 99 10/07/2020 04:35 PM    Glucose (POC) 92 10/07/2020 11:22 AM    Glucose (POC) 121 (H) 10/07/2020 08:24 AM    Glucose (POC) 174 (H) 10/06/2020 09:46 PM    Glucose (POC) 161 (H) 10/06/2020 09:44 PM     No results for input(s): PH, PCO2, PO2, HCO3, FIO2 in the last 72 hours. No results for input(s): INR, INREXT, INREXT in the last 72 hours.     No results found for: SDES  No results found for: CULT    All Cardiac Markers in the last 24 hours: No results found for: CPK, CK, CKMMB, CKMB, RCK3, CKMBT, CKNDX, CKND1, ERIK, TROPT, TROIQ, IRA, TROPT, TNIPOC, BNP, BNPP        Intervals noted   Pt s/e @ bedside     Subjective:     Chief Complaint:      R LOWER RIBS PAIN under control   Asking when can go home. Deny other complain    ROS:  No CP/SOB/N/V/D/Pains/Bleeding/ acute joint swelling/rash/itching/fever/chills. Tolerating Diet                Objective:     Vitals:  Last 24hrs VS reviewed since prior progress note. Most recent are:    Visit Vitals  BP (!) 146/72   Pulse 61   Temp 97.5 °F (36.4 °C)   Resp 18   Ht 5' 3\" (1.6 m)   Wt 109.3 kg (241 lb)   SpO2 97%   Breastfeeding No   BMI 42.69 kg/m²     SpO2 Readings from Last 6 Encounters:   10/07/20 97%   07/11/18 97%          No intake or output data in the 24 hours ending 10/13/20 9773       Physical Exam:   Gen:  Appear stated age, Well-developed, in no acute distress  HEENT: significant loss of teeth,   Head atraumatic, normocephalic , hearing intact to voice, moist  mucous membranes. Neck:   Trachea midline , No apparent JVD, Supple, without masses, thyroid non-tender  Resp:  No accessory muscle use,Bilateral BS present, clear breath sounds without wheezes rales or rhonchi  Card:    normal S1, S2 without Gallop . No Significant lower leg peripheral edema. Abd:  Soft, non-tender, non-distended, bowel sounds are present    Musc:  No cyanosis or clubbing. Skin:   Warm and dry . No rashes or ulcers, skin turgor is good. Neuro:  Cranial nerves are grossly intact, no clear area of focal motor weakness, follows commands appropriately. Psych:    oriented to person, place and time, alert. Medications Reviewed: (see below)    Lab Data Reviewed: (see below)    ______________________________________________________________________    Medications:     No current facility-administered medications for this encounter.       Current Outpatient Medications   Medication Sig    hydrALAZINE (APRESOLINE) 50 mg tablet Take 1 Tab by mouth three (3) times daily for 30 days.  amLODIPine (NORVASC) 5 mg tablet Take 1 Tab by mouth daily for 30 days.  albuterol (PROVENTIL HFA, VENTOLIN HFA, PROAIR HFA) 90 mcg/actuation inhaler Take 2 Puffs by inhalation two (2) times a day.  SITagliptin (Januvia) 25 mg tablet Take 25 mg by mouth daily. Indications: type 2 diabetes mellitus    aspirin 81 mg chewable tablet Take 81 mg by mouth daily.  Omeprazole delayed release (PRILOSEC D/R) 20 mg tablet Take 20 mg by mouth daily.  atorvastatin (LIPITOR) 40 mg tablet Take 40 mg by mouth daily.  omeprazole (PRILOSEC) 20 mg capsule Take 40 mg by mouth daily.  phenytoin (DILANTIN) 50 mg chewable tablet Take 100 mg by mouth three (3) times daily.  acetaminophen (TYLENOL EXTRA STRENGTH) 500 mg tablet Take 2 Tabs by mouth every six (6) hours as needed for Pain.  traMADol (ULTRAM) 50 mg tablet Take 1 Tab by mouth every six (6) hours as needed for Pain. Max Daily Amount: 200 mg. Total time spent with patient: 25 minutes                  Care Plan discussed with: Patient, Care Manager, Nursing Staff and >50% of time spent in counseling and coordination of care    Discussed:  Care Plan    Prophylaxis:  Hep SQ    Disposition:  Home w/Family           This document in whole or part of it has been produced using voice recognition software. Unrecognized errors in transcription may be present.     Attending Physician: Bella Tsai MD